# Patient Record
Sex: MALE
[De-identification: names, ages, dates, MRNs, and addresses within clinical notes are randomized per-mention and may not be internally consistent; named-entity substitution may affect disease eponyms.]

---

## 2019-05-24 ENCOUNTER — HOSPITAL (OUTPATIENT)
Dept: OTHER | Age: 24
End: 2019-05-24
Attending: SURGERY

## 2020-01-24 ENCOUNTER — TELEPHONE (OUTPATIENT)
Dept: SCHEDULING | Age: 25
End: 2020-01-24

## 2020-01-31 ENCOUNTER — APPOINTMENT (OUTPATIENT)
Dept: INTERNAL MEDICINE | Age: 25
End: 2020-01-31

## 2020-08-17 ENCOUNTER — OFFICE VISIT (OUTPATIENT)
Dept: URGENT CARE | Facility: CLINIC | Age: 25
End: 2020-08-17
Payer: COMMERCIAL

## 2020-08-17 VITALS
OXYGEN SATURATION: 97 % | TEMPERATURE: 98 F | RESPIRATION RATE: 19 BRPM | DIASTOLIC BLOOD PRESSURE: 88 MMHG | HEART RATE: 105 BPM | BODY MASS INDEX: 27.4 KG/M2 | HEIGHT: 69 IN | SYSTOLIC BLOOD PRESSURE: 136 MMHG | WEIGHT: 185 LBS

## 2020-08-17 DIAGNOSIS — Z03.818 ENCOUNTER FOR OBSERVATION FOR SUSPECTED EXPOSURE TO OTHER BIOLOGICAL AGENTS RULED OUT: ICD-10-CM

## 2020-08-17 DIAGNOSIS — J03.90 TONSILLITIS: Primary | ICD-10-CM

## 2020-08-17 LAB
CTP QC/QA: YES
S PYO RRNA THROAT QL PROBE: NEGATIVE

## 2020-08-17 PROCEDURE — U0003 INFECTIOUS AGENT DETECTION BY NUCLEIC ACID (DNA OR RNA); SEVERE ACUTE RESPIRATORY SYNDROME CORONAVIRUS 2 (SARS-COV-2) (CORONAVIRUS DISEASE [COVID-19]), AMPLIFIED PROBE TECHNIQUE, MAKING USE OF HIGH THROUGHPUT TECHNOLOGIES AS DESCRIBED BY CMS-2020-01-R: HCPCS

## 2020-08-17 PROCEDURE — 99203 PR OFFICE/OUTPT VISIT, NEW, LEVL III, 30-44 MIN: ICD-10-PCS | Mod: 25,S$GLB,, | Performed by: NURSE PRACTITIONER

## 2020-08-17 PROCEDURE — 87880 POCT RAPID STREP A: ICD-10-PCS | Mod: QW,S$GLB,, | Performed by: NURSE PRACTITIONER

## 2020-08-17 PROCEDURE — 99203 OFFICE O/P NEW LOW 30 MIN: CPT | Mod: 25,S$GLB,, | Performed by: NURSE PRACTITIONER

## 2020-08-17 PROCEDURE — 87880 STREP A ASSAY W/OPTIC: CPT | Mod: QW,S$GLB,, | Performed by: NURSE PRACTITIONER

## 2020-08-17 RX ORDER — IBUPROFEN 800 MG/1
800 TABLET ORAL 3 TIMES DAILY
Qty: 30 TABLET | Refills: 0 | Status: SHIPPED | OUTPATIENT
Start: 2020-08-17 | End: 2020-08-27

## 2020-08-17 NOTE — PATIENT INSTRUCTIONS
Here for TDAP injection. Patient without complaint of pain at this time ,Injection given. Tolerated well. No pain noted after injection.  Advised to wait in lobby 15 minutes and report any adverse reactions.         Site: LD    Baby Friendly Handout Given to Patient   TAKE IBUPROFEN AS NEEDED FOR PAIN  WE WILL CALL WITH RESULTS IN THE NEXT THREE DAYS  I WILL CALL YOU IN TWO DAYS TO SEE HOW YOU ARE DOING  You must understand that you've received an Urgent Care treatment only and that you may be released before all your medical problems are known or treated. You, the patient, will arrange for follow up care as instructed.  If your condition worsens we recommend that you receive another evaluation at the emergency room immediately or contact your primary medical clinics after hours call service to discuss your concerns.  Please return here or go to the Emergency Department for any concerns or worsening of condition.        Tonsillitis   Tonsillitis is an inflammation or infection of your child's tonsils. Your child has two tonsils, one on either side of his or her throat. The tonsils are large, oval glands. They help prevent infections. But tonsils can become infected themselves. Tonsillitis is a common childhood condition.    Tonsillitis can be caused by bacteria or a virus. The main symptom is a sore throat. Your child may also have a fever, throat redness or swelling, or trouble swallowing. The tonsils may also look white, gray, or yellow.  If your child has a bacterial infection, antibiotics may be prescribed. Antibiotics dont work against viral infections. In some cases of a viral infection, an antiviral medication may be prescribed. Once the problem has been treated, your child may need surgery to remove the tonsils if they become infected often or cause breathing problems.  Home care  If your childs health care provider has prescribed antibiotics or another medication, give it to your child as directed. Be sure your child finishes all of the medication, even if he or she feels better.  To help ease your childs sore throat:  · Give acetaminophen or ibuprofen. Follow the package instructions for giving these to a child. (Do not give aspirin to anyone younger than 18 years  old who is ill with a fever. It may cause severe liver damage.)  · Offer cool liquids to drink.  · Have your child gargle with warm salt water. An over-the-counter throat-numbing spray may also help.  The germs that cause tonsillitis are very contagious. To help prevent their spread, follow these tips:  · Teach your child to wash his or her hands frequently.  · Dont let your child share cups or utensils with other people.  · Keep your child away from other children until he or she is better.  Follow-up care  Follow up with your child's health care provider, or as advised.  When to seek medical advice  Unless advised otherwise, call your child's healthcare provider if:  · Your child is 3 months old or younger and has a fever of 100.4°F (38°C) or higher. Your child may need to see a healthcare provider.  · Your child is of any age and has fevers higher than 104°F (40°C) that come back again and again.  Also call if any of the following occur:  · Child has a sore throat for more than 2 days  · Child has a sore throat with fever, headache, stomachache, or rash  · Child has neck pain  · Child has a seizure  · Child is acting strangely  · Child has trouble swallowing or breathing  · Child cant open his or her mouth fully  Date Last Reviewed: 3/22/2015  © 2628-5777 [employer]. [address] [place] [address]. All rights reserved. This information is not intended as a substitute for professional medical care. Always follow your healthcare professional's instructions.

## 2020-08-17 NOTE — PROGRESS NOTES
"Subjective:       Patient ID: Meet Gilliam is a 24 y.o. male.    Vitals:  height is 5' 9" (1.753 m) and weight is 83.9 kg (185 lb). His oral temperature is 98.2 °F (36.8 °C). His blood pressure is 136/88 and his pulse is 105. His respiration is 19 and oxygen saturation is 97%.     Chief Complaint: COVID-19 Concerns    Pt states that he gets strep frequently and is concerned about that. Symptoms started this morning     Sore Throat   This is a new problem. The current episode started today. The problem has been unchanged. The pain is worse on the right side. There has been no fever. The pain is at a severity of 3/10. The pain is mild. Associated symptoms include trouble swallowing. Pertinent negatives include no congestion, coughing, diarrhea, headaches, shortness of breath or vomiting. He has had no exposure to strep or mono. He has tried nothing for the symptoms.       Constitution: Negative for chills, fatigue and fever.   HENT: Positive for sore throat and trouble swallowing. Negative for congestion and sinus pain.    Neck: Negative for painful lymph nodes.   Cardiovascular: Negative for chest pain and leg swelling.   Eyes: Negative for eye trauma, foreign body in eye, eye discharge, eye itching, eye pain, eye redness, photophobia, vision loss, double vision, blurred vision and eyelid swelling.   Respiratory: Negative for cough and shortness of breath.    Gastrointestinal: Negative for nausea, vomiting and diarrhea.   Genitourinary: Negative for dysuria, frequency and urgency.   Musculoskeletal: Negative for joint pain, joint swelling, muscle cramps and muscle ache.   Skin: Negative for color change, pale and rash.   Allergic/Immunologic: Negative for seasonal allergies and itching.   Neurological: Negative for dizziness, history of vertigo, light-headedness, passing out and headaches.   Hematologic/Lymphatic: Negative for swollen lymph nodes, easy bruising/bleeding and history of blood clots. Does not " bruise/bleed easily.   Psychiatric/Behavioral: Negative for nervous/anxious, sleep disturbance and depression. The patient is not nervous/anxious.        Objective:      Physical Exam   Constitutional: He is oriented to person, place, and time. He appears well-developed. He is cooperative.  Non-toxic appearance. He does not appear ill. No distress.   HENT:   Head: Normocephalic and atraumatic.   Ears:   Right Ear: Hearing, tympanic membrane, external ear and ear canal normal.   Left Ear: Hearing, tympanic membrane, external ear and ear canal normal.   Nose: Nose normal. No mucosal edema, rhinorrhea or nasal deformity. No epistaxis. Right sinus exhibits no maxillary sinus tenderness and no frontal sinus tenderness. Left sinus exhibits no maxillary sinus tenderness and no frontal sinus tenderness.   Mouth/Throat: Uvula is midline and mucous membranes are normal. No trismus in the jaw. Normal dentition. No uvula swelling. Posterior oropharyngeal erythema present. No oropharyngeal exudate or posterior oropharyngeal edema. Tonsils are 2+ on the right. Tonsils are 2+ on the left. No tonsillar exudate.   Eyes: Conjunctivae and lids are normal. No scleral icterus.   Neck: Trachea normal, full passive range of motion without pain and phonation normal. Neck supple. No neck rigidity. No edema and no erythema present.   Cardiovascular: Normal rate, regular rhythm, normal heart sounds and normal pulses.   Pulmonary/Chest: Effort normal and breath sounds normal. No respiratory distress. He has no decreased breath sounds. He has no rhonchi.   Abdominal: Normal appearance.   Musculoskeletal: Normal range of motion.         General: No deformity.   Neurological: He is alert and oriented to person, place, and time. He exhibits normal muscle tone. Coordination normal.   Skin: Skin is warm, dry, intact, not diaphoretic and not pale. Psychiatric: His speech is normal and behavior is normal. Judgment and thought content normal.   Nursing  note and vitals reviewed.        Assessment:       1. Tonsillitis        Plan:         Tonsillitis  -     POCT rapid strep A  -     (Magic mouthwash) 1:1:1 Benadryl 12.5mg/5ml liq, aluminum & magnesium hydroxide-simehticone (Maalox), lidocaine viscous 2%; Swish and spit 10 mLs every 4 (four) hours as needed. for mouth sores  Dispense: 200 mL; Refill: 0  -     ibuprofen (ADVIL,MOTRIN) 800 MG tablet; Take 1 tablet (800 mg total) by mouth 3 (three) times daily. for 10 days  Dispense: 30 tablet; Refill: 0        -COVID screening       Patient Instructions   TAKE IBUPROFEN AS NEEDED FOR PAIN  WE WILL CALL WITH RESULTS IN THE NEXT THREE DAYS  I WILL CALL YOU IN TWO DAYS TO SEE HOW YOU ARE DOING  You must understand that you've received an Urgent Care treatment only and that you may be released before all your medical problems are known or treated. You, the patient, will arrange for follow up care as instructed.  If your condition worsens we recommend that you receive another evaluation at the emergency room immediately or contact your primary medical clinics after hours call service to discuss your concerns.  Please return here or go to the Emergency Department for any concerns or worsening of condition.        Tonsillitis   Tonsillitis is an inflammation or infection of your child's tonsils. Your child has two tonsils, one on either side of his or her throat. The tonsils are large, oval glands. They help prevent infections. But tonsils can become infected themselves. Tonsillitis is a common childhood condition.    Tonsillitis can be caused by bacteria or a virus. The main symptom is a sore throat. Your child may also have a fever, throat redness or swelling, or trouble swallowing. The tonsils may also look white, gray, or yellow.  If your child has a bacterial infection, antibiotics may be prescribed. Antibiotics dont work against viral infections. In some cases of a viral infection, an antiviral medication may be  prescribed. Once the problem has been treated, your child may need surgery to remove the tonsils if they become infected often or cause breathing problems.  Home care  If your childs health care provider has prescribed antibiotics or another medication, give it to your child as directed. Be sure your child finishes all of the medication, even if he or she feels better.  To help ease your childs sore throat:  · Give acetaminophen or ibuprofen. Follow the package instructions for giving these to a child. (Do not give aspirin to anyone younger than 18 years old who is ill with a fever. It may cause severe liver damage.)  · Offer cool liquids to drink.  · Have your child gargle with warm salt water. An over-the-counter throat-numbing spray may also help.  The germs that cause tonsillitis are very contagious. To help prevent their spread, follow these tips:  · Teach your child to wash his or her hands frequently.  · Dont let your child share cups or utensils with other people.  · Keep your child away from other children until he or she is better.  Follow-up care  Follow up with your child's health care provider, or as advised.  When to seek medical advice  Unless advised otherwise, call your child's healthcare provider if:  · Your child is 3 months old or younger and has a fever of 100.4°F (38°C) or higher. Your child may need to see a healthcare provider.  · Your child is of any age and has fevers higher than 104°F (40°C) that come back again and again.  Also call if any of the following occur:  · Child has a sore throat for more than 2 days  · Child has a sore throat with fever, headache, stomachache, or rash  · Child has neck pain  · Child has a seizure  · Child is acting strangely  · Child has trouble swallowing or breathing  · Child cant open his or her mouth fully  Date Last Reviewed: 3/22/2015  © 1664-3196 [employer]. [address] [place] [address], [place] [address]. All rights reserved. This  information is not intended as a substitute for professional medical care. Always follow your healthcare professional's instructions.

## 2020-08-19 LAB — SARS-COV-2 RNA RESP QL NAA+PROBE: NOT DETECTED

## 2020-08-20 ENCOUNTER — TELEPHONE (OUTPATIENT)
Dept: URGENT CARE | Facility: CLINIC | Age: 25
End: 2020-08-20

## 2020-08-20 NOTE — TELEPHONE ENCOUNTER
Patient was given negative COVID-19 was on 08/17/2022.  Patient reports significant improvement in his symptoms.  Clinic versus ER precautions given.  Patient verbalized understanding and agreed with care.

## 2020-10-07 ENCOUNTER — CLINICAL SUPPORT (OUTPATIENT)
Dept: URGENT CARE | Facility: CLINIC | Age: 25
End: 2020-10-07
Payer: COMMERCIAL

## 2020-10-07 ENCOUNTER — OFFICE VISIT (OUTPATIENT)
Dept: URGENT CARE | Facility: CLINIC | Age: 25
End: 2020-10-07
Payer: COMMERCIAL

## 2020-10-07 DIAGNOSIS — Z23 IMMUNIZATION DUE: Primary | ICD-10-CM

## 2020-10-07 PROCEDURE — 90686 FLU VACCINE (QUAD) GREATER THAN OR EQUAL TO 3YO PRESERVATIVE FREE IM: ICD-10-PCS | Mod: S$GLB,,, | Performed by: PHYSICIAN ASSISTANT

## 2020-10-07 PROCEDURE — 90686 IIV4 VACC NO PRSV 0.5 ML IM: CPT | Mod: S$GLB,,, | Performed by: PHYSICIAN ASSISTANT

## 2020-10-07 PROCEDURE — 90471 FLU VACCINE (QUAD) GREATER THAN OR EQUAL TO 3YO PRESERVATIVE FREE IM: ICD-10-PCS | Mod: S$GLB,,, | Performed by: PHYSICIAN ASSISTANT

## 2020-10-07 PROCEDURE — 90471 IMMUNIZATION ADMIN: CPT | Mod: S$GLB,,, | Performed by: PHYSICIAN ASSISTANT

## 2020-10-14 ENCOUNTER — OFFICE VISIT (OUTPATIENT)
Dept: URGENT CARE | Facility: CLINIC | Age: 25
End: 2020-10-14
Payer: COMMERCIAL

## 2020-10-14 VITALS
RESPIRATION RATE: 18 BRPM | HEART RATE: 98 BPM | WEIGHT: 180 LBS | TEMPERATURE: 98 F | BODY MASS INDEX: 26.66 KG/M2 | HEIGHT: 69 IN | SYSTOLIC BLOOD PRESSURE: 142 MMHG | OXYGEN SATURATION: 98 % | DIASTOLIC BLOOD PRESSURE: 95 MMHG

## 2020-10-14 DIAGNOSIS — Z11.59 SCREENING FOR VIRAL DISEASE: Primary | ICD-10-CM

## 2020-10-14 DIAGNOSIS — J02.9 SORE THROAT: ICD-10-CM

## 2020-10-14 LAB
CTP QC/QA: YES
CTP QC/QA: YES
MOLECULAR STREP A: NEGATIVE
SARS-COV-2 RDRP RESP QL NAA+PROBE: NEGATIVE

## 2020-10-14 PROCEDURE — 87651 STREP A DNA AMP PROBE: CPT | Mod: QW,S$GLB,, | Performed by: NURSE PRACTITIONER

## 2020-10-14 PROCEDURE — 99214 PR OFFICE/OUTPT VISIT, EST, LEVL IV, 30-39 MIN: ICD-10-PCS | Mod: S$GLB,CS,, | Performed by: NURSE PRACTITIONER

## 2020-10-14 PROCEDURE — 99214 OFFICE O/P EST MOD 30 MIN: CPT | Mod: S$GLB,CS,, | Performed by: NURSE PRACTITIONER

## 2020-10-14 PROCEDURE — U0002 COVID-19 LAB TEST NON-CDC: HCPCS | Mod: QW,S$GLB,, | Performed by: NURSE PRACTITIONER

## 2020-10-14 PROCEDURE — 87651 POCT STREP A MOLECULAR: ICD-10-PCS | Mod: QW,S$GLB,, | Performed by: NURSE PRACTITIONER

## 2020-10-14 PROCEDURE — U0002: ICD-10-PCS | Mod: QW,S$GLB,, | Performed by: NURSE PRACTITIONER

## 2020-10-14 NOTE — PROGRESS NOTES
"Subjective:       Patient ID: Meet Gilliam is a 24 y.o. male.    Vitals:  height is 5' 9" (1.753 m) and weight is 81.6 kg (180 lb). His temperature is 97.5 °F (36.4 °C). His blood pressure is 142/95 (abnormal) and his pulse is 98. His respiration is 18 and oxygen saturation is 98%.     Chief Complaint: Sore Throat    Sore throat for the past 2days    Sore Throat   This is a new problem. The current episode started yesterday. Associated symptoms include trouble swallowing. Pertinent negatives include no congestion, coughing, diarrhea, headaches, shortness of breath or vomiting.       Constitution: Negative for chills, fatigue and fever.   HENT: Positive for sore throat and trouble swallowing. Negative for congestion.    Neck: Negative for painful lymph nodes.   Cardiovascular: Negative for chest pain and leg swelling.   Eyes: Negative.  Negative for double vision and blurred vision.   Respiratory: Negative.  Negative for cough and shortness of breath.    Gastrointestinal: Negative.  Negative for nausea, vomiting and diarrhea.   Genitourinary: Negative for dysuria, frequency and urgency.   Musculoskeletal: Negative for joint pain, joint swelling, muscle cramps and muscle ache.   Skin: Negative for color change, pale and rash.   Allergic/Immunologic: Negative for seasonal allergies.   Neurological: Negative.  Negative for dizziness, history of vertigo, light-headedness, passing out and headaches.   Hematologic/Lymphatic: Negative for swollen lymph nodes, easy bruising/bleeding and history of blood clots. Does not bruise/bleed easily.   Psychiatric/Behavioral: Negative for nervous/anxious, sleep disturbance and depression. The patient is not nervous/anxious.        Objective:      Physical Exam   Constitutional: He is oriented to person, place, and time. He appears well-developed. He is cooperative.  Non-toxic appearance. He does not appear ill. No distress.   HENT:   Head: Normocephalic and atraumatic.   Ears:   Right " Ear: Hearing, tympanic membrane, external ear and ear canal normal.   Left Ear: Hearing, tympanic membrane, external ear and ear canal normal.   Nose: Nose normal. No mucosal edema, rhinorrhea or nasal deformity. No epistaxis. Right sinus exhibits no maxillary sinus tenderness and no frontal sinus tenderness. Left sinus exhibits no maxillary sinus tenderness and no frontal sinus tenderness.   Mouth/Throat: Uvula is midline, oropharynx is clear and moist and mucous membranes are normal. No trismus in the jaw. Normal dentition. No uvula swelling. No oropharyngeal exudate, posterior oropharyngeal edema or posterior oropharyngeal erythema.   Eyes: Conjunctivae and lids are normal. No scleral icterus.   Neck: Trachea normal, full passive range of motion without pain and phonation normal. Neck supple. No neck rigidity. No edema and no erythema present.   Cardiovascular: Normal rate, regular rhythm, normal heart sounds and normal pulses.   Pulmonary/Chest: Effort normal and breath sounds normal. No respiratory distress. He has no decreased breath sounds. He has no rhonchi.   Abdominal: Normal appearance.   Musculoskeletal: Normal range of motion.         General: No deformity.   Neurological: He is alert and oriented to person, place, and time. He exhibits normal muscle tone. Coordination normal.   Skin: Skin is warm, dry, intact, not diaphoretic and not pale. Psychiatric: His speech is normal and behavior is normal. Judgment and thought content normal.   Nursing note and vitals reviewed.        Results for orders placed or performed in visit on 10/14/20   POCT COVID-19 Rapid Screening   Result Value Ref Range    POC Rapid COVID Negative Negative     Acceptable Yes      Assessment:       1. Screening for viral disease    2. Sore throat        Plan:         Screening for viral disease    Sore throat  -     POCT COVID-19 Rapid Screening  -     POCT Strep A, Molecular      Patient Instructions     Take  ibuprofen every 6 hours as needed for pain.     Increase fluid intake.     Follow up if symptoms persist or worsen.     You must understand that you've received an Urgent Care treatment only and that you may be released before all your medical problems are known or treated. You, the patient, will arrange for follow up care as instructed.  Follow up with your PCP or specialty clinic as directed in the next 1-2 weeks if not improved or as needed.  You can call [phone] to schedule an appointment with the appropriate provider.  If your condition worsens we recommend that you receive another evaluation at the emergency room immediately or contact your primary medical clinics after hours call service to discuss your concerns.  Please return here or go to the Emergency Department for any concerns or worsening of condition.      When You Have a Sore Throat    A sore throat can be painful. There are many reasons why you may have a sore throat. Your healthcare provider will work with you to find the cause of your sore throat. He or she will also find the best treatment for you.  What causes a sore throat?  Sore throats can be caused or worsened by:  · Cold or flu viruses  · Bacteria  · Irritants such as tobacco smoke or air pollution  · Acid reflux  A healthy throat  The tonsils are on the sides of the throat near the base of the tongue. They collect viruses and bacteria and help fight infection. The throat (pharynx) is the passage for air. Mucus from the nasal cavity also moves down the passage.  An inflamed throat  The tonsils and pharynx can become inflamed due to a cold or flu virus. Postnasal drip (excess mucus draining from the nasal cavity) can irritate the throat. It can also make the throat or tonsils more likely to be infected by bacteria. Severe, untreated tonsillitis in children or adults can cause a pocket of pus (abscess) to form near the tonsil.  Your evaluation  A medical evaluation can help find the  cause of your sore throat. It can also help your healthcare provider choose the best treatment for you. The evaluation may include a health history, physical exam, and diagnostic tests.  Health history  Your healthcare provider may ask you the following:  · How long has the sore throat lasted and how have you been treating it?  · Do you have any other symptoms, such as body aches, fever, or cough?  · Does your sore throat recur? If so, how often? How many days of school or work have you missed because of a sore throat?  · Do you have trouble eating or swallowing?  · Have you been told that you snore or have other sleep problems?  · Do you have bad breath?  · Do you cough up bad-tasting mucus?  Physical exam  During the exam, your healthcare provider checks your ears, nose, and throat for problems. He or she also checks for swelling in the neck, and may listen to your chest.  Possible tests  Other tests your healthcare provider may perform include:  · A throat swab to check for bacteria such as streptococcus (the bacteria that causes strep throat)  · A blood test to check for mononucleosis (a viral infection)  · A chest X-ray to rule out pneumonia, especially if you have a cough  Treating a sore throat  Treatment depends on many factors. What is the likely cause? Is the problem recent? Does it keep coming back? In many cases, the best thing to do is to treat the symptoms, rest, and let the problem heal itself. Antibiotics may help clear up some bacterial infections. For cases of severe or recurring tonsillitis, the tonsils may need to be removed.  Relieving your symptoms  · Dont smoke, and avoid secondhand smoke.  · For children, try throat sprays or Popsicles. Adults and older children may try lozenges.  · Drink warm liquids to soothe the throat and help thin mucus. Avoid alcohol, spicy foods, and acidic drinks such as orange juice. These can irritate the throat.  · Gargle with warm saltwater (1 teaspoon of salt  "to 8 ounces of warm water).  · Use a humidifier to keep air moist and relieve throat dryness.  · Try over-the-counter pain relievers such as acetaminophen or ibuprofen. Use as directed, and dont exceed the recommended dose. Dont give aspirin to children.   Are antibiotics needed?  If your sore throat is due to a bacterial infection, antibiotics may speed healing and prevent complications. Although group A streptococcus ("strep throat" or GAS) is the major treatable infection for a sore throat, GAS causes only 5% to 15% of sore throats in adults who seek medical care. Most sore throats are caused by cold or flu viruses. And antibiotics dont treat viral illness. In fact, using antibiotics when theyre not needed may produce bacteria that are harder to kill. Your healthcare provider will prescribe antibiotics only if he or she thinks they are likely to help.  If antibiotics are prescribed  Take the medicine exactly as directed. Be sure to finish your prescription even if youre feeling better. And be sure to ask your healthcare provider or pharmacist what side effects are common and what to do about them.  Is surgery needed?  In some cases, tonsils need to be removed. This is often done as outpatient (same-day) surgery. Your healthcare provider may advise removing the tonsils in cases of:  · Several severe bouts of tonsillitis in a year. Severe episodes include those that lead to missed days of school or work, or that need to be treated with antibiotics.  · Tonsillitis that causes breathing problems during sleep  · Tonsillitis caused by food particles collecting in pouches in the tonsils (cryptic tonsillitis)  Call your healthcare provider if any of the following occur:  · Symptoms worsen, or new symptoms develop.  · Swollen tonsils make breathing difficult.  · The pain is severe enough to keep you from drinking liquids.  · A skin rash, hives, or wheezing develops. Any of these could signal an allergic reaction to " antibiotics.  · Symptoms dont improve within a week.  · Symptoms dont improve within 2 to 3 days of starting antibiotics.   Date Last Reviewed: 10/1/2016  © 3404-3820 [employer]. [address] [place] [address], [place] [address]. All rights reserved. This information is not intended as a substitute for professional medical care. Always follow your healthcare professional's instructions.

## 2020-10-14 NOTE — PATIENT INSTRUCTIONS
Take ibuprofen every 6 hours as needed for pain.     Increase fluid intake.     Follow up if symptoms persist or worsen.     You must understand that you've received an Urgent Care treatment only and that you may be released before all your medical problems are known or treated. You, the patient, will arrange for follow up care as instructed.  Follow up with your PCP or specialty clinic as directed in the next 1-2 weeks if not improved or as needed.  You can call [phone] to schedule an appointment with the appropriate provider.  If your condition worsens we recommend that you receive another evaluation at the emergency room immediately or contact your primary medical clinics after hours call service to discuss your concerns.  Please return here or go to the Emergency Department for any concerns or worsening of condition.      When You Have a Sore Throat    A sore throat can be painful. There are many reasons why you may have a sore throat. Your healthcare provider will work with you to find the cause of your sore throat. He or she will also find the best treatment for you.  What causes a sore throat?  Sore throats can be caused or worsened by:  · Cold or flu viruses  · Bacteria  · Irritants such as tobacco smoke or air pollution  · Acid reflux  A healthy throat  The tonsils are on the sides of the throat near the base of the tongue. They collect viruses and bacteria and help fight infection. The throat (pharynx) is the passage for air. Mucus from the nasal cavity also moves down the passage.  An inflamed throat  The tonsils and pharynx can become inflamed due to a cold or flu virus. Postnasal drip (excess mucus draining from the nasal cavity) can irritate the throat. It can also make the throat or tonsils more likely to be infected by bacteria. Severe, untreated tonsillitis in children or adults can cause a pocket of pus (abscess) to form near the tonsil.  Your evaluation  A medical evaluation can help find  the cause of your sore throat. It can also help your healthcare provider choose the best treatment for you. The evaluation may include a health history, physical exam, and diagnostic tests.  Health history  Your healthcare provider may ask you the following:  · How long has the sore throat lasted and how have you been treating it?  · Do you have any other symptoms, such as body aches, fever, or cough?  · Does your sore throat recur? If so, how often? How many days of school or work have you missed because of a sore throat?  · Do you have trouble eating or swallowing?  · Have you been told that you snore or have other sleep problems?  · Do you have bad breath?  · Do you cough up bad-tasting mucus?  Physical exam  During the exam, your healthcare provider checks your ears, nose, and throat for problems. He or she also checks for swelling in the neck, and may listen to your chest.  Possible tests  Other tests your healthcare provider may perform include:  · A throat swab to check for bacteria such as streptococcus (the bacteria that causes strep throat)  · A blood test to check for mononucleosis (a viral infection)  · A chest X-ray to rule out pneumonia, especially if you have a cough  Treating a sore throat  Treatment depends on many factors. What is the likely cause? Is the problem recent? Does it keep coming back? In many cases, the best thing to do is to treat the symptoms, rest, and let the problem heal itself. Antibiotics may help clear up some bacterial infections. For cases of severe or recurring tonsillitis, the tonsils may need to be removed.  Relieving your symptoms  · Dont smoke, and avoid secondhand smoke.  · For children, try throat sprays or Popsicles. Adults and older children may try lozenges.  · Drink warm liquids to soothe the throat and help thin mucus. Avoid alcohol, spicy foods, and acidic drinks such as orange juice. These can irritate the throat.  · Gargle with warm saltwater (1 teaspoon of  "salt to 8 ounces of warm water).  · Use a humidifier to keep air moist and relieve throat dryness.  · Try over-the-counter pain relievers such as acetaminophen or ibuprofen. Use as directed, and dont exceed the recommended dose. Dont give aspirin to children.   Are antibiotics needed?  If your sore throat is due to a bacterial infection, antibiotics may speed healing and prevent complications. Although group A streptococcus ("strep throat" or GAS) is the major treatable infection for a sore throat, GAS causes only 5% to 15% of sore throats in adults who seek medical care. Most sore throats are caused by cold or flu viruses. And antibiotics dont treat viral illness. In fact, using antibiotics when theyre not needed may produce bacteria that are harder to kill. Your healthcare provider will prescribe antibiotics only if he or she thinks they are likely to help.  If antibiotics are prescribed  Take the medicine exactly as directed. Be sure to finish your prescription even if youre feeling better. And be sure to ask your healthcare provider or pharmacist what side effects are common and what to do about them.  Is surgery needed?  In some cases, tonsils need to be removed. This is often done as outpatient (same-day) surgery. Your healthcare provider may advise removing the tonsils in cases of:  · Several severe bouts of tonsillitis in a year. Severe episodes include those that lead to missed days of school or work, or that need to be treated with antibiotics.  · Tonsillitis that causes breathing problems during sleep  · Tonsillitis caused by food particles collecting in pouches in the tonsils (cryptic tonsillitis)  Call your healthcare provider if any of the following occur:  · Symptoms worsen, or new symptoms develop.  · Swollen tonsils make breathing difficult.  · The pain is severe enough to keep you from drinking liquids.  · A skin rash, hives, or wheezing develops. Any of these could signal an allergic " reaction to antibiotics.  · Symptoms dont improve within a week.  · Symptoms dont improve within 2 to 3 days of starting antibiotics.   Date Last Reviewed: 10/1/2016  © 8867-9313 [employer]. [address] [place] [address], [place] [address]. All rights reserved. This information is not intended as a substitute for professional medical care. Always follow your healthcare professional's instructions.

## 2020-11-08 ENCOUNTER — OFFICE VISIT (OUTPATIENT)
Dept: URGENT CARE | Facility: CLINIC | Age: 25
End: 2020-11-08
Payer: COMMERCIAL

## 2020-11-08 VITALS
DIASTOLIC BLOOD PRESSURE: 100 MMHG | OXYGEN SATURATION: 98 % | SYSTOLIC BLOOD PRESSURE: 143 MMHG | BODY MASS INDEX: 26.66 KG/M2 | RESPIRATION RATE: 16 BRPM | TEMPERATURE: 97 F | HEIGHT: 69 IN | HEART RATE: 96 BPM | WEIGHT: 180 LBS

## 2020-11-08 DIAGNOSIS — L03.113 CELLULITIS OF RIGHT UPPER EXTREMITY: Primary | ICD-10-CM

## 2020-11-08 DIAGNOSIS — T63.484A INSECT STINGS, UNDETERMINED INTENT, INITIAL ENCOUNTER: ICD-10-CM

## 2020-11-08 PROCEDURE — 99214 PR OFFICE/OUTPT VISIT, EST, LEVL IV, 30-39 MIN: ICD-10-PCS | Mod: S$GLB,,, | Performed by: NURSE PRACTITIONER

## 2020-11-08 PROCEDURE — 99214 OFFICE O/P EST MOD 30 MIN: CPT | Mod: S$GLB,,, | Performed by: NURSE PRACTITIONER

## 2020-11-08 RX ORDER — SULFAMETHOXAZOLE AND TRIMETHOPRIM 800; 160 MG/1; MG/1
1 TABLET ORAL 2 TIMES DAILY
Qty: 14 TABLET | Refills: 0 | Status: SHIPPED | OUTPATIENT
Start: 2020-11-08 | End: 2020-11-15

## 2020-11-08 RX ORDER — MUPIROCIN 20 MG/G
OINTMENT TOPICAL
Qty: 22 G | Refills: 1 | Status: SHIPPED | OUTPATIENT
Start: 2020-11-08 | End: 2022-04-25

## 2020-11-08 NOTE — PROGRESS NOTES
"Subjective:       Patient ID: Meet Gilliam is a 25 y.o. male.    Vitals:  height is 5' 9" (1.753 m) and weight is 81.6 kg (180 lb). His temperature is 97.2 °F (36.2 °C). His blood pressure is 143/100 (abnormal) and his pulse is 96. His respiration is 16 and oxygen saturation is 98%.     Chief Complaint: Insect Bite    Pt has insect bite on RT Arm has worsened in redness since Friday.     Insect Bite  This is a new problem. The current episode started in the past 7 days (2 days). The problem occurs constantly. The problem has been gradually worsening. Pertinent negatives include no arthralgias, chest pain, chills, congestion, coughing, fatigue, fever, headaches, joint swelling, myalgias, nausea, rash, sore throat, vertigo or vomiting. Nothing aggravates the symptoms. He has tried nothing for the symptoms.       Constitution: Negative for chills, fatigue and fever.   HENT: Negative for congestion and sore throat.    Neck: Negative for painful lymph nodes.   Cardiovascular: Negative for chest pain and leg swelling.   Eyes: Negative for double vision and blurred vision.   Respiratory: Negative for cough and shortness of breath.    Gastrointestinal: Negative for nausea, vomiting and diarrhea.   Genitourinary: Negative for dysuria, frequency and urgency.   Musculoskeletal: Negative for joint pain, joint swelling, muscle cramps and muscle ache.   Skin: Negative for color change, pale and rash.   Allergic/Immunologic: Negative for seasonal allergies.   Neurological: Negative for dizziness, history of vertigo, light-headedness, passing out and headaches.   Hematologic/Lymphatic: Negative for swollen lymph nodes, easy bruising/bleeding and history of blood clots. Does not bruise/bleed easily.   Psychiatric/Behavioral: Negative for nervous/anxious, sleep disturbance and depression. The patient is not nervous/anxious.        Objective:      Physical Exam   Constitutional: He is oriented to person, place, and time. He appears " well-developed. He is cooperative.  Non-toxic appearance. He does not appear ill. No distress.   HENT:   Head: Normocephalic and atraumatic.   Ears:   Right Ear: Hearing, tympanic membrane, external ear and ear canal normal.   Left Ear: Hearing, tympanic membrane, external ear and ear canal normal.   Nose: Nose normal. No mucosal edema, rhinorrhea or nasal deformity. No epistaxis. Right sinus exhibits no maxillary sinus tenderness and no frontal sinus tenderness. Left sinus exhibits no maxillary sinus tenderness and no frontal sinus tenderness.   Mouth/Throat: Uvula is midline, oropharynx is clear and moist and mucous membranes are normal. No trismus in the jaw. Normal dentition. No uvula swelling. No posterior oropharyngeal erythema.   Eyes: Conjunctivae and lids are normal. Right eye exhibits no discharge. Left eye exhibits no discharge. No scleral icterus.   Neck: Trachea normal, normal range of motion, full passive range of motion without pain and phonation normal. Neck supple.   Cardiovascular: Normal rate, regular rhythm, normal heart sounds and normal pulses.   Pulmonary/Chest: Effort normal and breath sounds normal. No respiratory distress.   Abdominal: Soft. Normal appearance and bowel sounds are normal. He exhibits no distension, no pulsatile midline mass and no mass. There is no abdominal tenderness.   Musculoskeletal: Normal range of motion.         General: No deformity.   Neurological: He is alert and oriented to person, place, and time. He exhibits normal muscle tone. Coordination normal.   Skin: Skin is warm, dry, intact, not diaphoretic, not pale, rash and macular.      Psychiatric: His speech is normal and behavior is normal. Judgment and thought content normal.   Nursing note and vitals reviewed.              Assessment:       1. Cellulitis of right upper extremity        Plan:         Cellulitis of right upper extremity  -     sulfamethoxazole-trimethoprim 800-160mg (BACTRIM DS) 800-160 mg Tab;  Take 1 tablet by mouth 2 (two) times daily. for 7 days  Dispense: 14 tablet; Refill: 0  -     mupirocin (BACTROBAN) 2 % ointment; Apply to affected area 3 times daily  Dispense: 22 g; Refill: 1      Patient Instructions     Take oral antibiotics as prescribed.     Use cool compresses to help with redness, swelling , and itching.     Follow up if symptoms persist or worsen such as increased redness or fever.    You must understand that you've received an Urgent Care treatment only and that you may be released before all your medical problems are known or treated. You, the patient, will arrange for follow up care as instructed.  Follow up with your PCP or specialty clinic as directed in the next 1-2 weeks if not improved or as needed.  You can call [phone] to schedule an appointment with the appropriate provider.  If your condition worsens we recommend that you receive another evaluation at the emergency room immediately or contact your primary medical clinics after hours call service to discuss your concerns.  Please return here or go to the Emergency Department for any concerns or worsening of condition.      Discharge Instructions for Cellulitis  You have been diagnosed with cellulitis. This is an infection in the deepest layer of the skin. In some cases, the infection also affects the muscle. Cellulitis is caused by bacteria. The bacteria can enter the body through broken skin. This can happen with a cut, scratch, animal bite, or an insect bite that has been scratched. You may have been treated in the hospital with antibiotics and fluids. You will likely be given a prescription for antibiotics to take at home. This sheet will help you take care of yourself at home.  Home care  When you are home:  · Take the prescribed antibiotic medicine you are given as directed until it is gone. Take it even if you feel better. It treats the infection and stops it from returning. Not taking all the medicine can make  future infections hard to treat.  · Keep the infected area clean.  · When possible, raise the infected area above the level of your heart. This helps keep swelling down.  · Talk with your healthcare provider if you are in pain. Ask what kind of over-the-counter medicine you can take for pain.  · Apply clean bandages as advised.  · Take your temperature once a day for a week.  · Wash your hands often to prevent spreading the infection.  In the future, wash your hands before and after you touch cuts, scratches, or bandages. This will help prevent infection.   When to call your healthcare provider  Call your healthcare provider immediately if you have any of the following:  · Difficulty or pain when moving the joints above or below the infected area  · Discharge or pus draining from the area  · Fever of 100.4°F (38°C) or higher, or as directed by your healthcare provider  · Pain that gets worse in or around the infected   · Redness that gets worse in or around the infected area, particularly if the area of redness expands to a wider area  · Shaking chills  · Swelling of the infected area  · Vomiting   Date Last Reviewed: 8/1/2016  © 9836-6314 [employer]. [address] [place] [address]. All rights reserved. This information is not intended as a substitute for professional medical care. Always follow your healthcare professional's instructions.

## 2020-11-08 NOTE — PATIENT INSTRUCTIONS
Take oral antibiotics as prescribed.     Use cool compresses to help with redness, swelling , and itching.     Follow up if symptoms persist or worsen such as increased redness or fever.    You must understand that you've received an Urgent Care treatment only and that you may be released before all your medical problems are known or treated. You, the patient, will arrange for follow up care as instructed.  Follow up with your PCP or specialty clinic as directed in the next 1-2 weeks if not improved or as needed.  You can call [phone] to schedule an appointment with the appropriate provider.  If your condition worsens we recommend that you receive another evaluation at the emergency room immediately or contact your primary medical clinics after hours call service to discuss your concerns.  Please return here or go to the Emergency Department for any concerns or worsening of condition.      Discharge Instructions for Cellulitis  You have been diagnosed with cellulitis. This is an infection in the deepest layer of the skin. In some cases, the infection also affects the muscle. Cellulitis is caused by bacteria. The bacteria can enter the body through broken skin. This can happen with a cut, scratch, animal bite, or an insect bite that has been scratched. You may have been treated in the hospital with antibiotics and fluids. You will likely be given a prescription for antibiotics to take at home. This sheet will help you take care of yourself at home.  Home care  When you are home:  · Take the prescribed antibiotic medicine you are given as directed until it is gone. Take it even if you feel better. It treats the infection and stops it from returning. Not taking all the medicine can make future infections hard to treat.  · Keep the infected area clean.  · When possible, raise the infected area above the level of your heart. This helps keep swelling down.  · Talk with your healthcare provider if you are in pain.  Ask what kind of over-the-counter medicine you can take for pain.  · Apply clean bandages as advised.  · Take your temperature once a day for a week.  · Wash your hands often to prevent spreading the infection.  In the future, wash your hands before and after you touch cuts, scratches, or bandages. This will help prevent infection.   When to call your healthcare provider  Call your healthcare provider immediately if you have any of the following:  · Difficulty or pain when moving the joints above or below the infected area  · Discharge or pus draining from the area  · Fever of 100.4°F (38°C) or higher, or as directed by your healthcare provider  · Pain that gets worse in or around the infected   · Redness that gets worse in or around the infected area, particularly if the area of redness expands to a wider area  · Shaking chills  · Swelling of the infected area  · Vomiting   Date Last Reviewed: 8/1/2016  © 4923-6111 [employer]. [address] [place] [address], [place] [address]. All rights reserved. This information is not intended as a substitute for professional medical care. Always follow your healthcare professional's instructions.

## 2020-12-24 ENCOUNTER — OFFICE VISIT (OUTPATIENT)
Dept: URGENT CARE | Facility: CLINIC | Age: 25
End: 2020-12-24
Payer: COMMERCIAL

## 2020-12-24 VITALS
OXYGEN SATURATION: 98 % | WEIGHT: 185 LBS | HEIGHT: 69 IN | HEART RATE: 110 BPM | DIASTOLIC BLOOD PRESSURE: 84 MMHG | TEMPERATURE: 98 F | SYSTOLIC BLOOD PRESSURE: 134 MMHG | BODY MASS INDEX: 27.4 KG/M2

## 2020-12-24 DIAGNOSIS — J02.9 SORE THROAT: Primary | ICD-10-CM

## 2020-12-24 DIAGNOSIS — H66.91 RIGHT OTITIS MEDIA, UNSPECIFIED OTITIS MEDIA TYPE: ICD-10-CM

## 2020-12-24 DIAGNOSIS — H92.09 OTALGIA, UNSPECIFIED LATERALITY: ICD-10-CM

## 2020-12-24 DIAGNOSIS — Z11.59 SCREENING FOR VIRAL DISEASE: ICD-10-CM

## 2020-12-24 PROCEDURE — 87651 POCT STREP A MOLECULAR: ICD-10-PCS | Mod: QW,S$GLB,, | Performed by: FAMILY MEDICINE

## 2020-12-24 PROCEDURE — 3008F PR BODY MASS INDEX (BMI) DOCUMENTED: ICD-10-PCS | Mod: CPTII,S$GLB,, | Performed by: FAMILY MEDICINE

## 2020-12-24 PROCEDURE — U0002 COVID-19 LAB TEST NON-CDC: HCPCS | Mod: QW,S$GLB,, | Performed by: FAMILY MEDICINE

## 2020-12-24 PROCEDURE — 99214 PR OFFICE/OUTPT VISIT, EST, LEVL IV, 30-39 MIN: ICD-10-PCS | Mod: S$GLB,,, | Performed by: FAMILY MEDICINE

## 2020-12-24 PROCEDURE — U0002: ICD-10-PCS | Mod: QW,S$GLB,, | Performed by: FAMILY MEDICINE

## 2020-12-24 PROCEDURE — 99214 OFFICE O/P EST MOD 30 MIN: CPT | Mod: S$GLB,,, | Performed by: FAMILY MEDICINE

## 2020-12-24 PROCEDURE — 3008F BODY MASS INDEX DOCD: CPT | Mod: CPTII,S$GLB,, | Performed by: FAMILY MEDICINE

## 2020-12-24 PROCEDURE — 87651 STREP A DNA AMP PROBE: CPT | Mod: QW,S$GLB,, | Performed by: FAMILY MEDICINE

## 2020-12-24 RX ORDER — AMOXICILLIN 875 MG/1
875 TABLET, FILM COATED ORAL 2 TIMES DAILY
Qty: 20 TABLET | Refills: 0 | Status: SHIPPED | OUTPATIENT
Start: 2020-12-24 | End: 2021-01-03

## 2020-12-24 NOTE — PROGRESS NOTES
"Subjective:       Patient ID: Meet Gilliam is a 25 y.o. male.    Vitals:  height is 5' 9" (1.753 m) and weight is 83.9 kg (185 lb). His temperature is 98.1 °F (36.7 °C). His blood pressure is 134/84 and his pulse is 110. His oxygen saturation is 98%.     Chief Complaint: Sore Throat    Patient presents with complaint of sore throat and ear pain off and on for 1 week.  Denies fever, chills, cough, runny nose, chest pain, shortness of breath, headache, body aches, fatigue, nausea/vomiting, diarrhea, loss of smell or taste.    Sore Throat   This is a new problem. The current episode started 1 to 4 weeks ago. The problem has been unchanged. The pain is worse on the right side. There has been no fever. The pain is at a severity of 6/10. The pain is moderate. Associated symptoms include ear pain. Pertinent negatives include no congestion, coughing, shortness of breath, stridor or vomiting.       Constitution: Negative for chills, sweating, fatigue and fever.   HENT: Positive for ear pain and sore throat. Negative for congestion, sinus pain, sinus pressure and voice change.    Neck: Negative for painful lymph nodes.   Eyes: Negative for eye redness.   Respiratory: Negative for chest tightness, cough, sputum production, bloody sputum, COPD, shortness of breath, stridor, wheezing and asthma.    Gastrointestinal: Negative for nausea and vomiting.   Musculoskeletal: Negative for muscle ache.   Skin: Negative for rash.   Allergic/Immunologic: Negative for seasonal allergies and asthma.   Hematologic/Lymphatic: Negative for swollen lymph nodes.       Objective:      Physical Exam   Constitutional: He is oriented to person, place, and time. He appears well-developed. He is cooperative.  Non-toxic appearance. He does not appear ill. No distress.   HENT:   Head: Normocephalic and atraumatic.   Ears:   Right Ear: Hearing, external ear and ear canal normal.   Left Ear: Hearing, tympanic membrane, external ear and ear canal normal. "      Comments: Positive right TM erythema.  Nose: Nose normal. No mucosal edema, rhinorrhea, nasal deformity or congestion. No epistaxis. Right sinus exhibits no maxillary sinus tenderness and no frontal sinus tenderness. Left sinus exhibits no maxillary sinus tenderness and no frontal sinus tenderness.   Mouth/Throat: Uvula is midline and mucous membranes are normal. No trismus in the jaw. Normal dentition. No uvula swelling. Posterior oropharyngeal erythema present. No oropharyngeal exudate.      Comments: Positive bilateral tonsillar erythema and mild swelling without exudates.    Eyes: Conjunctivae and lids are normal. Right eye exhibits no discharge. Left eye exhibits no discharge. No scleral icterus.   Neck: Trachea normal, normal range of motion, full passive range of motion without pain and phonation normal. Neck supple. No muscular tenderness present.   Cardiovascular: Normal rate, regular rhythm, normal heart sounds and normal pulses.   No murmur heard.  Pulmonary/Chest: Effort normal and breath sounds normal. No stridor. No respiratory distress. He has no wheezes. He has no rhonchi. He has no rales.   Abdominal: Soft. Normal appearance and bowel sounds are normal. He exhibits no distension, no pulsatile midline mass and no mass. There is no abdominal tenderness.   Musculoskeletal: Normal range of motion.         General: No deformity.   Lymphadenopathy:     He has no cervical adenopathy.   Neurological: He is alert and oriented to person, place, and time. He exhibits normal muscle tone. Coordination normal.   Skin: Skin is warm, dry, intact, not diaphoretic and not pale. Psychiatric: His speech is normal and behavior is normal. Judgment and thought content normal.   Nursing note and vitals reviewed.        Assessment:       1. Sore throat    2. Screening for viral disease    3. Otalgia, unspecified laterality    4. Right otitis media, unspecified otitis media type        Plan:         Sore throat  -      POCT Strep A, Molecular    Screening for viral disease  -     POCT COVID-19 Rapid Screening    Otalgia, unspecified laterality    Right otitis media, unspecified otitis media type    Other orders  -     amoxicillin (AMOXIL) 875 MG tablet; Take 1 tablet (875 mg total) by mouth 2 (two) times daily. for 10 days  Dispense: 20 tablet; Refill: 0        PLEASE READ YOUR DISCHARGE INSTRUCTIONS ENTIRELY AS IT CONTAINS IMPORTANT INFORMATION.    Please return here or go to the Emergency Department for any concerns or worsening of condition.    If you were prescribed antibiotics, please take them to completion.      If not allergic, please take over the counter Tylenol (Acetaminophen) and/or Motrin (Ibuprofen) as directed for control of pain and/or fever.  Please follow up with your primary care doctor or specialist as needed.      If you smoke, please stop smoking.    Please return or see your primary care doctor if you develop new or worsening symptoms.     Please arrange follow up with your primary medical clinic as soon as possible. You must understand that you've received an Urgent Care treatment only and that you may be released before all of your medical problems are known or treated. You, the patient, will arrange for follow up as instructed. If your symptoms worsen or fail to improve you should go to the Emergency Room.  Understanding Middle Ear Infections in Children    Middle ear infections are most common in children under age 5. Crankiness, a fever, and tugging at or rubbing the ear may all be signs that your child has a middle ear infection. This is especially true if your child has a cold or other viral illness. It's important to call your healthcare provider if you see these or any of the signs listed below.  Call your child's healthcare provider if you notice any signs of a middle ear infection.   What are middle ear infections?  Middle ear infections occur behind the eardrum. The eardrum is the thin sheet of  tissue that passes sound waves between the outer and middle ear. These infections are usually caused by bacteria or viruses. These are often related to a recent cold or allergy problem.  A blocked tube  In young children, these bacteria or viruses likely reach the middle ear by traveling the short length of the eustachian tube from the back of the nose. Once in the middle ear, they multiply and spread. This irritates delicate tissues lining the middle ear and eustachian tube. If the tube lining swells enough to block off the tube, air pressure drops in the middle ear. This pulls the eardrum inward, making it stiffer and less able to transmit sound.  Fluid buildup causes pain  Once the eustachian tube swells shut, moisture cant drain from the middle ear. Fluid that should flush out the infection builds up in the chamber. This may raise pressure behind the eardrum. This can decrease pain slightly. But if the infection spreads to this fluid, pressure behind the eardrum goes way up. The eardrum is forced outward. It becomes painful, and may break.  Chronic fluid affects hearing  If the eardrum doesnt break and the tube remains blocked, the fluid becomes an ongoing (chronic) condition. As the immediate (acute) infection passes, the middle ear fluid thickens. It becomes sticky and takes up less space. Pressure drops in the middle ear once more. Inward suction stiffens the eardrum. This affects hearing. If the fluid is not removed, the eardrum may be stretched and damaged.  Signs of middle ear problems  · A fever over 100.4°F (38.0°C) and cold symptoms  · Severe ear pain  · Any kind of discharge from the ear  · Ear pain that gets worse or doesnt go away after a few days   When to call your child's healthcare provider  Call your child's healthcare provider's office if your otherwise healthy child has any of the signs or symptoms described below:  · Fever (see Fever and children, below)  · Your child has had a seizure  caused by the fever  · Rapid breathing or shortness of breath  · A stiff neck or headache  · Trouble swallowing  · Your child acts ill after the fever is gone  · Persistent brown, green, or bloody mucus  · Signs of dehydration. These include severe thirst, dark yellow urine, infrequent urination, dull or sunken eyes, dry skin, and dry or cracked lips.  · Your child still doesn't look or act right to you, even after taking a non-aspirin pain reliever  Fever and children  Always use a digital thermometer to check your childs temperature. Never use a mercury thermometer.  For infants and toddlers, be sure to use a rectal thermometer correctly. A rectal thermometer may accidentally poke a hole in (perforate) the rectum. It may also pass on germs from the stool. Always follow the product makers directions for proper use. If you dont feel comfortable taking a rectal temperature, use another method. When you talk to your childs healthcare provider, tell him or her which method you used to take your childs temperature.  Here are guidelines for fever temperature. Ear temperatures arent accurate before 6 months of age. Dont take an oral temperature until your child is at least 4 years old.  Infant under 3 months old:  · Ask your childs healthcare provider how you should take the temperature.  · Rectal or forehead (temporal artery) temperature of 100.4°F (38°C) or higher, or as directed by the provider  · Armpit temperature of 99°F (37.2°C) or higher, or as directed by the provider  Child age 3 to 36 months:  · Rectal, forehead (temporal artery), or ear temperature of 102°F (38.9°C) or higher, or as directed by the provider  · Armpit temperature of 101°F (38.3°C) or higher, or as directed by the provider  Child of any age:  · Repeated temperature of 104°F (40°C) or higher, or as directed by the provider  · Fever that lasts more than 24 hours in a child under 2 years old. Or a fever that lasts for 3 days in a child 2  years or older.   Date Last Reviewed: 11/1/2016 © 2000-2017 [employer]. [address] [place] [address], [place] [address]. All rights reserved. This information is not intended as a substitute for professional medical care. Always follow your healthcare professional's instructions.        When Your Child Has Pharyngitis or Tonsillitis    Your childs throat feels sore. This is likely because of redness and swelling (inflammation) of the throat. Two areas of the throat are most often affected: the pharynx and tonsils. Inflammation of the pharynx (pharyngitis) and inflammation of the tonsils (tonsillitis) are very common in children. This sheet tells you what you can do to relieve your childs throat pain.  What causes pharyngitis or tonsillitis?  Most commonly, pharyngitis and tonsillitis are caused by a viral or bacterial infection.  What are the symptoms of pharyngitis or tonsillitis?  The main symptom of both conditions is a sore throat. Your child may also have a fever, redness or swelling of the throat, and trouble swallowing. You may feel lumps in the neck.  How is pharyngitis or tonsillitis diagnosed?  The healthcare provider will examine your childs throat. The healthcare provider might wipe (swab) your childs throat. This swab will be tested for the bacteria that causes an infection called strep throat. If needed, a blood test can be done to check for a viral infection such as mononucleosis.  How is pharyngitis or tonsillitis treated?  If your childs sore throat is caused by a bacterial infection, the healthcare provider may prescribe antibiotics. Otherwise, you can treat your childs sore throat at home. To do this:  · Give your child acetaminophen or ibuprofen to ease the pain. Don't use ibuprofen in children younger than 6 months of age or in children who are dehydrated or vomiting all of the time. Dont give your child aspirin to relieve a fever. Using aspirin to treat a fever in children  could cause a serious condition called Reye syndrome.  · Give your child cool liquids to drink.  · Have your child gargle with warm saltwater if it helps relieve pain. An over-the-counter throat numbing spray may also help.  What are the long-term concerns?  If your child has frequent sore throats, take him or her to see a healthcare provider. Removing the tonsils may help relieve your childs recurring problems.  When to call your child's healthcare provider  Call your childs healthcare provider right away if your otherwise healthy child has any of the following:  · Fever (see Fever and children, below)  · Sore throat pain that persists for 2 to 3 days  · Sore throat with fever, headache, stomachache, or rash  · Trouble turning or straightening the head  · Problems swallowing or drooling  · Trouble breathing or needing to lean forward to breathe  · Problems opening mouth fully     Fever and children  Always use a digital thermometer to check your childs temperature. Never use a mercury thermometer.  For infants and toddlers, be sure to use a rectal thermometer correctly. A rectal thermometer may accidentally poke a hole in (perforate) the rectum. It may also pass on germs from the stool. Always follow the product makers directions for proper use. If you dont feel comfortable taking a rectal temperature, use another method. When you talk to your childs healthcare provider, tell him or her which method you used to take your childs temperature.  Here are guidelines for fever temperature. Ear temperatures arent accurate before 6 months of age. Dont take an oral temperature until your child is at least 4 years old.  Infant under 3 months old:  · Ask your childs healthcare provider how you should take the temperature.  · Rectal or forehead (temporal artery) temperature of 100.4°F (38°C) or higher, or as directed by the provider  · Armpit temperature of 99°F (37.2°C) or higher, or as directed by the  provider  Child age 3 to 36 months:  · Rectal, forehead (temporal artery), or ear temperature of 102°F (38.9°C) or higher, or as directed by the provider  · Armpit temperature of 101°F (38.3°C) or higher, or as directed by the provider  Child of any age:  · Repeated temperature of 104°F (40°C) or higher, or as directed by the provider  · Fever that lasts more than 24 hours in a child under 2 years old. Or a fever that lasts for 3 days in a child 2 years or older.   Date Last Reviewed: 11/1/2016  © 5301-6199 [employer]. [address] [place] [address], [place] [address]. All rights reserved. This information is not intended as a substitute for professional medical care. Always follow your healthcare professional's instructions.        Self-Care for Sore Throats    Sore throats happen for many reasons, such as colds, allergies, and infections caused by viruses or bacteria. In any case, your throat becomes red and sore. Your goal for self-care is to reduce your discomfort while giving your throat a chance to heal.  Moisten and soothe your throat  Tips include the following:  · Try a sip of water first thing after waking up.  · Keep your throat moist by drinking 6 or more glasses of clear liquids every day.  · Run a cool-air humidifier in your room overnight.  · Avoid cigarette smoke.   · Suck on throat lozenges, cough drops, hard candy, ice chips, or frozen fruit-juice bars. Use the sugar-free versions if your diet or medical condition requires them.  Gargle to ease irritation  Gargling every hour or 2 can ease irritation. Try gargling with 1 of these solutions:  · 1/4 teaspoon of salt in 1/2 cup of warm water  · An over-the-counter anesthetic gargle  Use medicine for more relief  Over-the-counter medicine can reduce sore throat symptoms. Ask your pharmacist if you have questions about which medicine to use:  · Ease pain with anesthetic sprays. Aspirin or an aspirin substitute also helps. Remember, never give  aspirin to anyone 18 or younger, or if you are already taking blood thinners.   · For sore throats caused by allergies, try antihistamines to block the allergic reaction.  · Remember: unless a sore throat is caused by a bacterial infection, antibiotics wont help you.  Prevent future sore throats  Prevention tips include the following:  · Stop smoking or reduce contact with secondhand smoke. Smoke irritates the tender throat lining.  · Limit contact with pets and with allergy-causing substances, such as pollen and mold.  · When youre around someone with a sore throat or cold, wash your hands often to keep viruses or bacteria from spreading.  · Dont strain your vocal cords.  Call your healthcare provider  Contact your healthcare provider if you have:  · A temperature over 101°F (38.3°C)  · White spots on the throat  · Great difficulty swallowing  · Trouble breathing  · A skin rash  · Recent exposure to someone else with strep bacteria  · Severe hoarseness and swollen glands in the neck or jaw   Date Last Reviewed: 8/1/2016 © 2000-2017 [employer]. [address] [place] [address]. All rights reserved. This information is not intended as a substitute for professional medical care. Always follow your healthcare professional's instructions.    You have tested negative for COVID-19 today.  If you did not have any close exposure as defined below, then effective today, you can return to your normal daily activities including social distancing, wearing masks, and frequent handwashing.    A close exposure is defined as anyone who had a masked or an unmasked exposure to a known COVID -19 positive person, at less than 6 ft for more than 15 minutes.  If your exposure meets this definition, then you are required to quarantine for 14 days per the CDC.    The 14 day quarantine begins from the day you were exposed, not the day of your test.  For example, if your exposure was on a Monday, and you waited  until Friday of the same week to get tested and it was negative, your 14 day quarantine begins from that Monday, not the Friday you tested negative.    If you developed symptoms since the exposure, and your test was negative today, you still have to quarantine for 14 days from the date of the exposure.    So if you meet the definition of a close exposure, A NEGATIVE TEST DOES NOT GET YOU OUT OF 14 DAYS OF QUARANTINE!

## 2020-12-24 NOTE — PATIENT INSTRUCTIONS
PLEASE READ YOUR DISCHARGE INSTRUCTIONS ENTIRELY AS IT CONTAINS IMPORTANT INFORMATION.    Please return here or go to the Emergency Department for any concerns or worsening of condition.    If you were prescribed antibiotics, please take them to completion.      If not allergic, please take over the counter Tylenol (Acetaminophen) and/or Motrin (Ibuprofen) as directed for control of pain and/or fever.  Please follow up with your primary care doctor or specialist as needed.      If you smoke, please stop smoking.    Please return or see your primary care doctor if you develop new or worsening symptoms.     Please arrange follow up with your primary medical clinic as soon as possible. You must understand that you've received an Urgent Care treatment only and that you may be released before all of your medical problems are known or treated. You, the patient, will arrange for follow up as instructed. If your symptoms worsen or fail to improve you should go to the Emergency Room.  Understanding Middle Ear Infections in Children    Middle ear infections are most common in children under age 5. Crankiness, a fever, and tugging at or rubbing the ear may all be signs that your child has a middle ear infection. This is especially true if your child has a cold or other viral illness. It's important to call your healthcare provider if you see these or any of the signs listed below.  Call your child's healthcare provider if you notice any signs of a middle ear infection.   What are middle ear infections?  Middle ear infections occur behind the eardrum. The eardrum is the thin sheet of tissue that passes sound waves between the outer and middle ear. These infections are usually caused by bacteria or viruses. These are often related to a recent cold or allergy problem.  A blocked tube  In young children, these bacteria or viruses likely reach the middle ear by traveling the short length of the eustachian tube from the back of the  nose. Once in the middle ear, they multiply and spread. This irritates delicate tissues lining the middle ear and eustachian tube. If the tube lining swells enough to block off the tube, air pressure drops in the middle ear. This pulls the eardrum inward, making it stiffer and less able to transmit sound.  Fluid buildup causes pain  Once the eustachian tube swells shut, moisture cant drain from the middle ear. Fluid that should flush out the infection builds up in the chamber. This may raise pressure behind the eardrum. This can decrease pain slightly. But if the infection spreads to this fluid, pressure behind the eardrum goes way up. The eardrum is forced outward. It becomes painful, and may break.  Chronic fluid affects hearing  If the eardrum doesnt break and the tube remains blocked, the fluid becomes an ongoing (chronic) condition. As the immediate (acute) infection passes, the middle ear fluid thickens. It becomes sticky and takes up less space. Pressure drops in the middle ear once more. Inward suction stiffens the eardrum. This affects hearing. If the fluid is not removed, the eardrum may be stretched and damaged.  Signs of middle ear problems  · A fever over 100.4°F (38.0°C) and cold symptoms  · Severe ear pain  · Any kind of discharge from the ear  · Ear pain that gets worse or doesnt go away after a few days   When to call your child's healthcare provider  Call your child's healthcare provider's office if your otherwise healthy child has any of the signs or symptoms described below:  · Fever (see Fever and children, below)  · Your child has had a seizure caused by the fever  · Rapid breathing or shortness of breath  · A stiff neck or headache  · Trouble swallowing  · Your child acts ill after the fever is gone  · Persistent brown, green, or bloody mucus  · Signs of dehydration. These include severe thirst, dark yellow urine, infrequent urination, dull or sunken eyes, dry skin, and dry or cracked  lips.  · Your child still doesn't look or act right to you, even after taking a non-aspirin pain reliever  Fever and children  Always use a digital thermometer to check your childs temperature. Never use a mercury thermometer.  For infants and toddlers, be sure to use a rectal thermometer correctly. A rectal thermometer may accidentally poke a hole in (perforate) the rectum. It may also pass on germs from the stool. Always follow the product makers directions for proper use. If you dont feel comfortable taking a rectal temperature, use another method. When you talk to your childs healthcare provider, tell him or her which method you used to take your childs temperature.  Here are guidelines for fever temperature. Ear temperatures arent accurate before 6 months of age. Dont take an oral temperature until your child is at least 4 years old.  Infant under 3 months old:  · Ask your childs healthcare provider how you should take the temperature.  · Rectal or forehead (temporal artery) temperature of 100.4°F (38°C) or higher, or as directed by the provider  · Armpit temperature of 99°F (37.2°C) or higher, or as directed by the provider  Child age 3 to 36 months:  · Rectal, forehead (temporal artery), or ear temperature of 102°F (38.9°C) or higher, or as directed by the provider  · Armpit temperature of 101°F (38.3°C) or higher, or as directed by the provider  Child of any age:  · Repeated temperature of 104°F (40°C) or higher, or as directed by the provider  · Fever that lasts more than 24 hours in a child under 2 years old. Or a fever that lasts for 3 days in a child 2 years or older.   Date Last Reviewed: 11/1/2016  © 5188-9968 [email or web address]. [address] [place] [address], [place] [address]. All rights reserved. This information is not intended as a substitute for professional medical care. Always follow your healthcare professional's instructions.        When Your Child Has Pharyngitis or  Tonsillitis    Your childs throat feels sore. This is likely because of redness and swelling (inflammation) of the throat. Two areas of the throat are most often affected: the pharynx and tonsils. Inflammation of the pharynx (pharyngitis) and inflammation of the tonsils (tonsillitis) are very common in children. This sheet tells you what you can do to relieve your childs throat pain.  What causes pharyngitis or tonsillitis?  Most commonly, pharyngitis and tonsillitis are caused by a viral or bacterial infection.  What are the symptoms of pharyngitis or tonsillitis?  The main symptom of both conditions is a sore throat. Your child may also have a fever, redness or swelling of the throat, and trouble swallowing. You may feel lumps in the neck.  How is pharyngitis or tonsillitis diagnosed?  The healthcare provider will examine your childs throat. The healthcare provider might wipe (swab) your childs throat. This swab will be tested for the bacteria that causes an infection called strep throat. If needed, a blood test can be done to check for a viral infection such as mononucleosis.  How is pharyngitis or tonsillitis treated?  If your childs sore throat is caused by a bacterial infection, the healthcare provider may prescribe antibiotics. Otherwise, you can treat your childs sore throat at home. To do this:  · Give your child acetaminophen or ibuprofen to ease the pain. Don't use ibuprofen in children younger than 6 months of age or in children who are dehydrated or vomiting all of the time. Dont give your child aspirin to relieve a fever. Using aspirin to treat a fever in children could cause a serious condition called Reye syndrome.  · Give your child cool liquids to drink.  · Have your child gargle with warm saltwater if it helps relieve pain. An over-the-counter throat numbing spray may also help.  What are the long-term concerns?  If your child has frequent sore throats, take him or her to see a healthcare  provider. Removing the tonsils may help relieve your childs recurring problems.  When to call your child's healthcare provider  Call your childs healthcare provider right away if your otherwise healthy child has any of the following:  · Fever (see Fever and children, below)  · Sore throat pain that persists for 2 to 3 days  · Sore throat with fever, headache, stomachache, or rash  · Trouble turning or straightening the head  · Problems swallowing or drooling  · Trouble breathing or needing to lean forward to breathe  · Problems opening mouth fully     Fever and children  Always use a digital thermometer to check your childs temperature. Never use a mercury thermometer.  For infants and toddlers, be sure to use a rectal thermometer correctly. A rectal thermometer may accidentally poke a hole in (perforate) the rectum. It may also pass on germs from the stool. Always follow the product makers directions for proper use. If you dont feel comfortable taking a rectal temperature, use another method. When you talk to your childs healthcare provider, tell him or her which method you used to take your childs temperature.  Here are guidelines for fever temperature. Ear temperatures arent accurate before 6 months of age. Dont take an oral temperature until your child is at least 4 years old.  Infant under 3 months old:  · Ask your childs healthcare provider how you should take the temperature.  · Rectal or forehead (temporal artery) temperature of 100.4°F (38°C) or higher, or as directed by the provider  · Armpit temperature of 99°F (37.2°C) or higher, or as directed by the provider  Child age 3 to 36 months:  · Rectal, forehead (temporal artery), or ear temperature of 102°F (38.9°C) or higher, or as directed by the provider  · Armpit temperature of 101°F (38.3°C) or higher, or as directed by the provider  Child of any age:  · Repeated temperature of 104°F (40°C) or higher, or as directed by the provider  · Fever  that lasts more than 24 hours in a child under 2 years old. Or a fever that lasts for 3 days in a child 2 years or older.   Date Last Reviewed: 11/1/2016 © 2000-2017 [employer]. [address] [place] [address], [place] [address]. All rights reserved. This information is not intended as a substitute for professional medical care. Always follow your healthcare professional's instructions.        Self-Care for Sore Throats    Sore throats happen for many reasons, such as colds, allergies, and infections caused by viruses or bacteria. In any case, your throat becomes red and sore. Your goal for self-care is to reduce your discomfort while giving your throat a chance to heal.  Moisten and soothe your throat  Tips include the following:  · Try a sip of water first thing after waking up.  · Keep your throat moist by drinking 6 or more glasses of clear liquids every day.  · Run a cool-air humidifier in your room overnight.  · Avoid cigarette smoke.   · Suck on throat lozenges, cough drops, hard candy, ice chips, or frozen fruit-juice bars. Use the sugar-free versions if your diet or medical condition requires them.  Gargle to ease irritation  Gargling every hour or 2 can ease irritation. Try gargling with 1 of these solutions:  · 1/4 teaspoon of salt in 1/2 cup of warm water  · An over-the-counter anesthetic gargle  Use medicine for more relief  Over-the-counter medicine can reduce sore throat symptoms. Ask your pharmacist if you have questions about which medicine to use:  · Ease pain with anesthetic sprays. Aspirin or an aspirin substitute also helps. Remember, never give aspirin to anyone 18 or younger, or if you are already taking blood thinners.   · For sore throats caused by allergies, try antihistamines to block the allergic reaction.  · Remember: unless a sore throat is caused by a bacterial infection, antibiotics wont help you.  Prevent future sore throats  Prevention tips include the following:  · Stop  smoking or reduce contact with secondhand smoke. Smoke irritates the tender throat lining.  · Limit contact with pets and with allergy-causing substances, such as pollen and mold.  · When youre around someone with a sore throat or cold, wash your hands often to keep viruses or bacteria from spreading.  · Dont strain your vocal cords.  Call your healthcare provider  Contact your healthcare provider if you have:  · A temperature over 101°F (38.3°C)  · White spots on the throat  · Great difficulty swallowing  · Trouble breathing  · A skin rash  · Recent exposure to someone else with strep bacteria  · Severe hoarseness and swollen glands in the neck or jaw   Date Last Reviewed: 8/1/2016  © 2197-9062 [employer]. [address] [place] [address], [place] [address]. All rights reserved. This information is not intended as a substitute for professional medical care. Always follow your healthcare professional's instructions.

## 2020-12-25 ENCOUNTER — TELEPHONE (OUTPATIENT)
Dept: URGENT CARE | Facility: CLINIC | Age: 25
End: 2020-12-25

## 2021-01-02 ENCOUNTER — OFFICE VISIT (OUTPATIENT)
Dept: URGENT CARE | Facility: CLINIC | Age: 26
End: 2021-01-02
Payer: COMMERCIAL

## 2021-01-02 VITALS
HEART RATE: 114 BPM | WEIGHT: 185 LBS | TEMPERATURE: 100 F | HEIGHT: 69 IN | RESPIRATION RATE: 18 BRPM | OXYGEN SATURATION: 97 % | DIASTOLIC BLOOD PRESSURE: 92 MMHG | SYSTOLIC BLOOD PRESSURE: 137 MMHG | BODY MASS INDEX: 27.4 KG/M2

## 2021-01-02 DIAGNOSIS — R00.0 TACHYCARDIA: ICD-10-CM

## 2021-01-02 DIAGNOSIS — R50.9 FEVER, UNSPECIFIED FEVER CAUSE: Primary | ICD-10-CM

## 2021-01-02 DIAGNOSIS — B34.9 VIRAL SYNDROME: ICD-10-CM

## 2021-01-02 LAB
CTP QC/QA: YES
CTP QC/QA: YES
POC MOLECULAR INFLUENZA A AGN: NEGATIVE
POC MOLECULAR INFLUENZA B AGN: NEGATIVE
SARS-COV-2 RDRP RESP QL NAA+PROBE: NEGATIVE

## 2021-01-02 PROCEDURE — 99214 PR OFFICE/OUTPT VISIT, EST, LEVL IV, 30-39 MIN: ICD-10-PCS | Mod: S$GLB,,, | Performed by: STUDENT IN AN ORGANIZED HEALTH CARE EDUCATION/TRAINING PROGRAM

## 2021-01-02 PROCEDURE — U0002: ICD-10-PCS | Mod: QW,S$GLB,, | Performed by: STUDENT IN AN ORGANIZED HEALTH CARE EDUCATION/TRAINING PROGRAM

## 2021-01-02 PROCEDURE — 87502 POCT INFLUENZA A/B MOLECULAR: ICD-10-PCS | Mod: QW,S$GLB,, | Performed by: STUDENT IN AN ORGANIZED HEALTH CARE EDUCATION/TRAINING PROGRAM

## 2021-01-02 PROCEDURE — 3008F PR BODY MASS INDEX (BMI) DOCUMENTED: ICD-10-PCS | Mod: CPTII,S$GLB,, | Performed by: STUDENT IN AN ORGANIZED HEALTH CARE EDUCATION/TRAINING PROGRAM

## 2021-01-02 PROCEDURE — 87502 INFLUENZA DNA AMP PROBE: CPT | Mod: QW,S$GLB,, | Performed by: STUDENT IN AN ORGANIZED HEALTH CARE EDUCATION/TRAINING PROGRAM

## 2021-01-02 PROCEDURE — U0002 COVID-19 LAB TEST NON-CDC: HCPCS | Mod: QW,S$GLB,, | Performed by: STUDENT IN AN ORGANIZED HEALTH CARE EDUCATION/TRAINING PROGRAM

## 2021-01-02 PROCEDURE — 3008F BODY MASS INDEX DOCD: CPT | Mod: CPTII,S$GLB,, | Performed by: STUDENT IN AN ORGANIZED HEALTH CARE EDUCATION/TRAINING PROGRAM

## 2021-01-02 PROCEDURE — 99214 OFFICE O/P EST MOD 30 MIN: CPT | Mod: S$GLB,,, | Performed by: STUDENT IN AN ORGANIZED HEALTH CARE EDUCATION/TRAINING PROGRAM

## 2021-01-09 ENCOUNTER — OFFICE VISIT (OUTPATIENT)
Dept: URGENT CARE | Facility: CLINIC | Age: 26
End: 2021-01-09
Payer: COMMERCIAL

## 2021-01-09 VITALS
BODY MASS INDEX: 27.39 KG/M2 | OXYGEN SATURATION: 97 % | SYSTOLIC BLOOD PRESSURE: 126 MMHG | DIASTOLIC BLOOD PRESSURE: 81 MMHG | HEIGHT: 69 IN | TEMPERATURE: 99 F | HEART RATE: 100 BPM | WEIGHT: 184.94 LBS

## 2021-01-09 DIAGNOSIS — U07.1 COVID-19 VIRUS INFECTION: Primary | ICD-10-CM

## 2021-01-09 DIAGNOSIS — U07.1 COVID-19 VIRUS DETECTED: ICD-10-CM

## 2021-01-09 LAB
CTP QC/QA: YES
SARS-COV-2 RDRP RESP QL NAA+PROBE: POSITIVE

## 2021-01-09 PROCEDURE — 3008F BODY MASS INDEX DOCD: CPT | Mod: CPTII,S$GLB,, | Performed by: PHYSICIAN ASSISTANT

## 2021-01-09 PROCEDURE — 99213 OFFICE O/P EST LOW 20 MIN: CPT | Mod: S$GLB,,, | Performed by: PHYSICIAN ASSISTANT

## 2021-01-09 PROCEDURE — U0002 COVID-19 LAB TEST NON-CDC: HCPCS | Mod: QW,S$GLB,, | Performed by: PHYSICIAN ASSISTANT

## 2021-01-09 PROCEDURE — U0002: ICD-10-PCS | Mod: QW,S$GLB,, | Performed by: PHYSICIAN ASSISTANT

## 2021-01-09 PROCEDURE — 99213 PR OFFICE/OUTPT VISIT, EST, LEVL III, 20-29 MIN: ICD-10-PCS | Mod: S$GLB,,, | Performed by: PHYSICIAN ASSISTANT

## 2021-01-09 PROCEDURE — 3008F PR BODY MASS INDEX (BMI) DOCUMENTED: ICD-10-PCS | Mod: CPTII,S$GLB,, | Performed by: PHYSICIAN ASSISTANT

## 2022-04-25 ENCOUNTER — LAB VISIT (OUTPATIENT)
Dept: LAB | Facility: HOSPITAL | Age: 27
End: 2022-04-25
Payer: COMMERCIAL

## 2022-04-25 ENCOUNTER — OFFICE VISIT (OUTPATIENT)
Dept: ALLERGY | Facility: CLINIC | Age: 27
End: 2022-04-25
Payer: COMMERCIAL

## 2022-04-25 VITALS — HEIGHT: 69 IN | WEIGHT: 202.19 LBS | BODY MASS INDEX: 29.95 KG/M2

## 2022-04-25 DIAGNOSIS — K21.9 GASTROESOPHAGEAL REFLUX DISEASE, UNSPECIFIED WHETHER ESOPHAGITIS PRESENT: ICD-10-CM

## 2022-04-25 DIAGNOSIS — J31.0 CHRONIC RHINITIS: ICD-10-CM

## 2022-04-25 DIAGNOSIS — J31.0 CHRONIC RHINITIS: Primary | ICD-10-CM

## 2022-04-25 PROCEDURE — 86003 ALLG SPEC IGE CRUDE XTRC EA: CPT | Mod: 59 | Performed by: STUDENT IN AN ORGANIZED HEALTH CARE EDUCATION/TRAINING PROGRAM

## 2022-04-25 PROCEDURE — 99204 PR OFFICE/OUTPT VISIT, NEW, LEVL IV, 45-59 MIN: ICD-10-PCS | Mod: S$GLB,,, | Performed by: STUDENT IN AN ORGANIZED HEALTH CARE EDUCATION/TRAINING PROGRAM

## 2022-04-25 PROCEDURE — 86003 ALLG SPEC IGE CRUDE XTRC EA: CPT | Performed by: STUDENT IN AN ORGANIZED HEALTH CARE EDUCATION/TRAINING PROGRAM

## 2022-04-25 PROCEDURE — 99999 PR PBB SHADOW E&M-EST. PATIENT-LVL III: ICD-10-PCS | Mod: PBBFAC,,, | Performed by: STUDENT IN AN ORGANIZED HEALTH CARE EDUCATION/TRAINING PROGRAM

## 2022-04-25 PROCEDURE — 36415 COLL VENOUS BLD VENIPUNCTURE: CPT | Mod: PN | Performed by: STUDENT IN AN ORGANIZED HEALTH CARE EDUCATION/TRAINING PROGRAM

## 2022-04-25 PROCEDURE — 1159F PR MEDICATION LIST DOCUMENTED IN MEDICAL RECORD: ICD-10-PCS | Mod: CPTII,S$GLB,, | Performed by: STUDENT IN AN ORGANIZED HEALTH CARE EDUCATION/TRAINING PROGRAM

## 2022-04-25 PROCEDURE — 1159F MED LIST DOCD IN RCRD: CPT | Mod: CPTII,S$GLB,, | Performed by: STUDENT IN AN ORGANIZED HEALTH CARE EDUCATION/TRAINING PROGRAM

## 2022-04-25 PROCEDURE — 99999 PR PBB SHADOW E&M-EST. PATIENT-LVL III: CPT | Mod: PBBFAC,,, | Performed by: STUDENT IN AN ORGANIZED HEALTH CARE EDUCATION/TRAINING PROGRAM

## 2022-04-25 PROCEDURE — 3008F PR BODY MASS INDEX (BMI) DOCUMENTED: ICD-10-PCS | Mod: CPTII,S$GLB,, | Performed by: STUDENT IN AN ORGANIZED HEALTH CARE EDUCATION/TRAINING PROGRAM

## 2022-04-25 PROCEDURE — 99204 OFFICE O/P NEW MOD 45 MIN: CPT | Mod: S$GLB,,, | Performed by: STUDENT IN AN ORGANIZED HEALTH CARE EDUCATION/TRAINING PROGRAM

## 2022-04-25 PROCEDURE — 3008F BODY MASS INDEX DOCD: CPT | Mod: CPTII,S$GLB,, | Performed by: STUDENT IN AN ORGANIZED HEALTH CARE EDUCATION/TRAINING PROGRAM

## 2022-04-25 RX ORDER — IPRATROPIUM BROMIDE 42 UG/1
2 SPRAY, METERED NASAL 3 TIMES DAILY PRN
Qty: 15 ML | Refills: 5 | Status: SHIPPED | OUTPATIENT
Start: 2022-04-25

## 2022-04-25 RX ORDER — SERTRALINE HYDROCHLORIDE 100 MG/1
100 TABLET, FILM COATED ORAL DAILY
COMMUNITY
Start: 2022-04-06

## 2022-04-25 RX ORDER — OMEPRAZOLE 10 MG/1
10 CAPSULE, DELAYED RELEASE ORAL DAILY
COMMUNITY

## 2022-04-25 RX ORDER — FLUTICASONE PROPIONATE 50 MCG
SPRAY, SUSPENSION (ML) NASAL
Qty: 16 G | Refills: 11 | Status: SHIPPED | OUTPATIENT
Start: 2022-04-25

## 2022-04-25 RX ORDER — AMLODIPINE BESYLATE 2.5 MG/1
2.5 TABLET ORAL DAILY
COMMUNITY
Start: 2022-04-06

## 2022-04-25 RX ORDER — LORATADINE 10 MG/1
10 TABLET ORAL DAILY
COMMUNITY

## 2022-04-25 NOTE — PROGRESS NOTES
ALLERGY & IMMUNOLOGY CLINIC - INITIAL CONSULTATION      HISTORY OF PRESENT ILLNESS     Patient ID: Meet Gilliam is a 26 y.o. male    CC: chronic rhinitis     HPI: Meet Gilliam is a 26 y.o. male presenting for chronic rhinitis     Symptoms started when he went to college in [facility] and worsened when he moved here 2 years ago. He is form [place].  Symptoms start with nasal pruritis, then a lot of mucus production and runny nose. Phlegm stuck in throat. Sometimes he can cough it out. He doesn't like having to cough up mucus in public, but he feels like he can't swallow it.  Patient endorses sneezing, rhinorrhea, post nasal drip, nasal pruritus.  Patient denies congestion, ocular pruritus.  Symptoms are perennial. When he was in [place], he thinks it was more seasonal (Spring and Fall).  Symptoms occur every day. Symptoms still present even when he travels.  There is no difference between indoors and outdoors.  The patient has found triggers to include grass. He wonders if his cat could be contributing to his symptoms.   The patient denies anosmia.   The patient endorses heartburn/reflux symptoms. He takes omeprazole every day which helps. He thinks reflux could be contributing to his rhinitis symptoms.    He uses claritin every day. It helps a lot with the itchy nose, but less so with mucus production. Mucinex has not helped. He has tried sudafed when he was sick, it didn't help. He tried flonase 2 SEN daily for about a month. It helped with congestion and itchy nose, but he still felt like he still had a lot of mucus in his throat.    He has never had allergy testing before.    When he drinks certain alcohol, he says his face feels flushed, there might be a patch where it feels raised and itchy. He says the bumps are small, maybe feels like a heat rash. It stays around for 30 minutes. Occurs when he drinks certain beer or wine, especially if he is eating with it. When it happens, he just ignores it  and it goes away on its own. Doesn't happen with hard liquor. His mother gets the same thing, but worse than he does. This does not happen often. It does not happen every time he drinks beer or wine.     REVIEW OF SYSTEMS     CONST: no F/C/NS, no unexplained weight changes  EYES:  no discharge, no pruritus  NOSE: no congestion, + rhinorrhea, + itching, + sneezing, no anosmia  PULM: no wheezing, + cough  GI: no dysphagia, + heartburn, no pain, no N/V/D  DERM: see HPI     MEDICAL HISTORY     Vaccines:   Immunization History   Administered Date(s) Administered    Influenza - Quadrivalent - PF *Preferred* (6 months and older) 10/07/2020       MedHx:   Patient Active Problem List   Diagnosis    COVID-19 virus infection       SurgHx:   History reviewed. No pertinent surgical history.    Medications:   Current Outpatient Medications on File Prior to Visit   Medication Sig Dispense Refill    amLODIPine (NORVASC) 2.5 MG tablet Take 2.5 mg by mouth once daily.      loratadine (CLARITIN) 10 mg tablet Take 10 mg by mouth once daily.      omeprazole (PRILOSEC) 10 MG capsule Take 10 mg by mouth once daily.      sertraline (ZOLOFT) 100 MG tablet Take 100 mg by mouth once daily.      [DISCONTINUED] (Magic mouthwash) 1:1:1 Benadryl 12.5mg/5ml liq, aluminum & magnesium hydroxide-simehticone (Maalox), lidocaine viscous 2% Swish and spit 10 mLs every 4 (four) hours as needed. for mouth sores (Patient not taking: Reported on 10/14/2020) 200 mL 0    [DISCONTINUED] mupirocin (BACTROBAN) 2 % ointment Apply to affected area 3 times daily (Patient not taking: Reported on 1/9/2021) 22 g 1     No current facility-administered medications on file prior to visit.       H/o Asthma: denies  H/o Eczema: denies  H/o Rhinitis: endorses  Oral Allergy: pineapple, walnut  Food Allergy: denies  Venom Allergy: denies    Drug Allergies: Review of patient's allergies indicates:  No Known Allergies     Env/Occ:   Pets: cat, might trigger  "symptoms  Occupation:     Infection Hx: Prior to covid pandemic, would get strep throat about twice per year. Otherwise denies frequent infections requiring antibiotics. No history of pneumonia.    SocHx:   ETOH: yes  Tobacco: no  Illicit Drug Use: no  Social History     Tobacco Use    Smoking status: Never Smoker    Smokeless tobacco: Never Used   Substance Use Topics    Alcohol use: Yes     Comment: social        FamHx:   Asthma: no  Allergic rhinitis: mother, father, and sister have all been on allergy shots  Food Allergy: no  Family History   Problem Relation Age of Onset    Allergies Mother     Allergies Father     Allergies Sister     Asthma Neg Hx         PHYSICAL EXAM     VS: Ht 5' 9.02" (1.753 m)   Wt 91.7 kg (202 lb 2.6 oz)   BMI 29.84 kg/m²   GENERAL: Alert, NAD, well-appearing, cooperative  EYES: EOMI, no conjunctival injection, no discharge, no infraorbital shiners  EARS: external auditory canals normal B/L, TM normal B/L  NOSE: NT 2-3 + B/L, no stringing mucous, no polyps visualized  ORAL: MMM, no ulcers, no thrush, no cobblestoning  NECK: no thyromegaly, no LAD  LUNGS: CTAB, no w/r/c, no increased WOB  HEART: RRR, normal S1/S2, no m/g/r  EXTREMITIES: No LE edema  DERM: no rashes, no skin breaks  NEURO: normal speech, normal gait, no facial asymmetry     LABORATORY STUDIES     No pertinent labs for this visit.     ALLERGEN TESTING     Immunocaps: ordered     ASSESSMENT & PLAN     Meet Gilliam is a 26 y.o. male with     # Chronic rhinitis: main symptoms are nasal pruritus, rhinorrhea, and sensation of mucus stuck in throat. Claritin and flonase help with some of the symptoms, but not with the sensation of mucus in his throat. Will do allergy testing. Discussed that some of his symptoms may be due to allergy and others not. He thinks reflux could be contributing to symptoms.  -immunocaps for aeroallergens ordered  -referring to ENT for eval of LPR  -continue daily " claritin  -restart flonase 2 SEN once to twice daily  -start ipratropium nasal spray 0.06% TID prn    # GERD:  -continue omeprazole  -referring to ENT for eval of LPR    # Facial swelling and rash with beer and wine: Lasts 30 minutes. Worse when he has food with it. Does not happen every time he has beer or wine. Does not happen with hard liquor. Unclear etiology. Low suspicion for aldehyde dehydrogenase deficiency as it doesn't happen with hard liquor. Maybe there is a component of alcohol-induced urticaria. Recommended he try taking an extra claritin prior to drinking beer or wine to try and prevent it.    Follow up: 2 months      Zachary Carolina MD  Allergy/Immunology

## 2022-04-28 LAB
A ALTERNATA IGE QN: <0.1 KU/L
A FUMIGATUS IGE QN: <0.1 KU/L
BERMUDA GRASS IGE QN: <0.1 KU/L
CAT DANDER IGE QN: <0.1 KU/L
CEDAR IGE QN: <0.1 KU/L
D FARINAE IGE QN: <0.1 KU/L
D PTERONYSS IGE QN: <0.1 KU/L
DEPRECATED A ALTERNATA IGE RAST QL: NORMAL
DEPRECATED A FUMIGATUS IGE RAST QL: NORMAL
DEPRECATED BERMUDA GRASS IGE RAST QL: NORMAL
DEPRECATED CAT DANDER IGE RAST QL: NORMAL
DEPRECATED CEDAR IGE RAST QL: NORMAL
DEPRECATED D FARINAE IGE RAST QL: NORMAL
DEPRECATED D PTERONYSS IGE RAST QL: NORMAL
DEPRECATED DOG DANDER IGE RAST QL: NORMAL
DEPRECATED ELDER IGE RAST QL: NORMAL
DEPRECATED ENGL PLANTAIN IGE RAST QL: NORMAL
DEPRECATED PECAN/HICK TREE IGE RAST QL: NORMAL
DEPRECATED ROACH IGE RAST QL: NORMAL
DEPRECATED TIMOTHY IGE RAST QL: NORMAL
DEPRECATED WEST RAGWEED IGE RAST QL: NORMAL
DEPRECATED WHITE OAK IGE RAST QL: NORMAL
DOG DANDER IGE QN: <0.1 KU/L
ELDER IGE QN: <0.1 KU/L
ENGL PLANTAIN IGE QN: <0.1 KU/L
PECAN/HICK TREE IGE QN: <0.1 KU/L
ROACH IGE QN: <0.1 KU/L
TIMOTHY IGE QN: <0.1 KU/L
WEST RAGWEED IGE QN: <0.1 KU/L
WHITE OAK IGE QN: <0.1 KU/L

## 2022-04-29 ENCOUNTER — TELEPHONE (OUTPATIENT)
Dept: ALLERGY | Facility: CLINIC | Age: 27
End: 2022-04-29
Payer: COMMERCIAL

## 2022-04-29 NOTE — TELEPHONE ENCOUNTER
----- Message from Zachary Carolina MD sent at 4/28/2022  5:07 PM CDT -----  Regarding: call with results  Hello,  This patient does not have the portal. Can someone please call him to let him know his allergy lab testing was negative for evidence of allergy to all inhalents tested.    Thanks,  Zachary Carolina MD  Allergy/Immunology

## 2022-05-10 ENCOUNTER — OFFICE VISIT (OUTPATIENT)
Dept: OTOLARYNGOLOGY | Facility: CLINIC | Age: 27
End: 2022-05-10
Payer: COMMERCIAL

## 2022-05-10 VITALS — HEART RATE: 94 BPM | TEMPERATURE: 98 F | DIASTOLIC BLOOD PRESSURE: 83 MMHG | SYSTOLIC BLOOD PRESSURE: 119 MMHG

## 2022-05-10 DIAGNOSIS — K21.9 GASTROESOPHAGEAL REFLUX DISEASE, UNSPECIFIED WHETHER ESOPHAGITIS PRESENT: ICD-10-CM

## 2022-05-10 DIAGNOSIS — J31.0 CHRONIC RHINITIS: ICD-10-CM

## 2022-05-10 DIAGNOSIS — J03.91 RECURRENT TONSILLITIS: Primary | ICD-10-CM

## 2022-05-10 PROCEDURE — 3074F PR MOST RECENT SYSTOLIC BLOOD PRESSURE < 130 MM HG: ICD-10-PCS | Mod: CPTII,S$GLB,, | Performed by: OTOLARYNGOLOGY

## 2022-05-10 PROCEDURE — 1160F RVW MEDS BY RX/DR IN RCRD: CPT | Mod: CPTII,S$GLB,, | Performed by: OTOLARYNGOLOGY

## 2022-05-10 PROCEDURE — 1159F MED LIST DOCD IN RCRD: CPT | Mod: CPTII,S$GLB,, | Performed by: OTOLARYNGOLOGY

## 2022-05-10 PROCEDURE — 99204 OFFICE O/P NEW MOD 45 MIN: CPT | Mod: 25,S$GLB,, | Performed by: OTOLARYNGOLOGY

## 2022-05-10 PROCEDURE — 1159F PR MEDICATION LIST DOCUMENTED IN MEDICAL RECORD: ICD-10-PCS | Mod: CPTII,S$GLB,, | Performed by: OTOLARYNGOLOGY

## 2022-05-10 PROCEDURE — 99204 PR OFFICE/OUTPT VISIT, NEW, LEVL IV, 45-59 MIN: ICD-10-PCS | Mod: 25,S$GLB,, | Performed by: OTOLARYNGOLOGY

## 2022-05-10 PROCEDURE — 3074F SYST BP LT 130 MM HG: CPT | Mod: CPTII,S$GLB,, | Performed by: OTOLARYNGOLOGY

## 2022-05-10 PROCEDURE — 3079F DIAST BP 80-89 MM HG: CPT | Mod: CPTII,S$GLB,, | Performed by: OTOLARYNGOLOGY

## 2022-05-10 PROCEDURE — 1160F PR REVIEW ALL MEDS BY PRESCRIBER/CLIN PHARMACIST DOCUMENTED: ICD-10-PCS | Mod: CPTII,S$GLB,, | Performed by: OTOLARYNGOLOGY

## 2022-05-10 PROCEDURE — 31575 PR LARYNGOSCOPY, FLEXIBLE; DIAGNOSTIC: ICD-10-PCS | Mod: S$GLB,,, | Performed by: OTOLARYNGOLOGY

## 2022-05-10 PROCEDURE — 31575 DIAGNOSTIC LARYNGOSCOPY: CPT | Mod: S$GLB,,, | Performed by: OTOLARYNGOLOGY

## 2022-05-10 PROCEDURE — 3079F PR MOST RECENT DIASTOLIC BLOOD PRESSURE 80-89 MM HG: ICD-10-PCS | Mod: CPTII,S$GLB,, | Performed by: OTOLARYNGOLOGY

## 2022-05-10 NOTE — PROGRESS NOTES
Mr. Gilliam     Vitals:    05/10/22 1414   BP: 119/83   Pulse: 94   Temp: 98 °F (36.7 °C)       Chief Complaint:  Sinusitis and Sinus Problem       HPI:   is a 26-year-old white male who presents referred by  for postnasal drip and acid reflux.  He states he has had reflux issues since he was in high school.  He is on daily omeprazole at this time.  He has been given dietary restrictions but is somewhat hesitant to follow these at this time.  He also complains of recurrent strep tonsillitis 2-4 times annually treated with antibiotics.  He does describe a globus sensation.    SNOT22- 9 NOSE- 0    Review of Systems:  Constitutional:   weight loss or weight gain: Negative  Allergy/Immunologic:   Negative  Nasal Congestion/Obstruction:   Negative  Nosebleeds:   Negative  Sinus infections:   Negative  Headache/Facial Pain:   Negative  Snoring/ROMINA:   Negative  Throat: Infections/Pain:   Negative  Hoarseness/Speech Disturbance:   Negative  Trauma Hx:  Negative    Cardiovascular:  M/I Angina: Negative  Hypertension:  He does report a history of hypertension.  Endocrine:    DM/Steroids: Negative  GI:   Dysphagia/Reflux:  Positive as above.  :   GYN Pregnancy: Negative  Renal:   Dialysis: Negative  Lymphatic:   Neck Mass/Lymphadenopathy: Negative  Muscoloskeletal:   Negative  Hematologic:   Bleeding Disorders/Anemia: Negative  Neurologic:    Cranial/Neuralgia: Negative  Pulmonary:   Asthma/SOB/Cough: Negative  Skin Disorders: Negative    Past Medical/Surgical/Family/Social History:    ENT Surgery: Negative  Occupational Exposure: Negative   Problems: Negative  Cancer: Negative    Past Family History:   Family history of Cancer: Negative    Past Social History:   Tobacco: Nonsmoker   Alcohol:  Regular Social Drinker      Allergies and medications: Reviewed per med card.    Physical Examination:  Ears:   External auditory canals:  Clear   Hearing: Grossly intact   Tympanic Membranes:  Clear  Nose:   External: Normal   Intranasal:  Inferiorly based septal spur on the right with mid septal spur on the left, 1 to 2+ turbinates, nasal airway clear at this time.  Mouth:   Intraorally: Lips, teeth, and gums: Normal   Oropharynx: Normal   Mucosa: Normal   Tongue: Normal  Throat:      Palate: Normal palate with elevation   Tonsils:  3+ tonsil on the right which appears irregularly shaped and 2+ tonsil imbedded on the left.   Posterior Pharynx: Normal  Fiberoptic exam:  Exam with flexible fiberoptic scope number [id number] reveals no intranasal masses, polyps, lesions or purulence.  Minimal adenoid pad.  His epiglottis and piriform sinuses as well as base of tongue are normal without lesions or masses.  True vocal cords are normal with no lesions or masses and are mobile bilaterally.  His aryepiglottic folds do not appear erythematous or edematous at this time.  Head/Face:     Inspection: Normal and atraumatic   Palpation/Percussion: Non tender   Facial strength: Normal and symmetric   Salivary glands: Normal  Neck: Supple  Thyroid: No masses  Lymphatics: No nodes  Respiratory:   Effort: Normal  Eyes:   Ocular Mobility: Normal   Vision: Grossly intact  Neuro/Psych:   Cranial Nerves: Grossly Intact   Orientation: Normal   Mood/Affect: Normal      Assessment/Plan:  I have discussed my findings with him in detail as well as my recommendations for treatment.  I have given him literature on saline sinus rinses including issues with distilled water only and described how this to be used in detail.  I have encouraged him to continue with his Flonase nasal spray and I have described how this is to be administered as well.  I have encouraged him to follow-up with his GI specialist regarding his reflux issues.  With his history of chronic recurrent tonsillitis and significantly asymmetric tonsils we have discussed tonsillectomy possible adenoidectomy.  I have discussed the pros and cons risks and benefits as well as  technical aspects of this procedure with him in detail.  He will consider all of this information let us know of his decision.

## 2022-05-30 ENCOUNTER — TELEPHONE (OUTPATIENT)
Dept: OTOLARYNGOLOGY | Facility: CLINIC | Age: 27
End: 2022-05-30
Payer: COMMERCIAL

## 2022-05-30 NOTE — TELEPHONE ENCOUNTER
Called pt to see if had picked a surgery date, pt stated he had not given it any thought, asked to pt to give us a call back when he was ready

## 2023-01-20 ENCOUNTER — OFFICE VISIT (OUTPATIENT)
Dept: DERMATOLOGY | Facility: CLINIC | Age: 28
End: 2023-01-20
Payer: COMMERCIAL

## 2023-01-20 DIAGNOSIS — D22.9 MULTIPLE BENIGN NEVI: ICD-10-CM

## 2023-01-20 DIAGNOSIS — L81.4 LENTIGINES: ICD-10-CM

## 2023-01-20 DIAGNOSIS — Z12.83 SKIN CANCER SCREENING: Primary | ICD-10-CM

## 2023-01-20 PROCEDURE — 1160F RVW MEDS BY RX/DR IN RCRD: CPT | Mod: CPTII,S$GLB,, | Performed by: DERMATOLOGY

## 2023-01-20 PROCEDURE — 99203 OFFICE O/P NEW LOW 30 MIN: CPT | Mod: S$GLB,,, | Performed by: DERMATOLOGY

## 2023-01-20 PROCEDURE — 1159F MED LIST DOCD IN RCRD: CPT | Mod: CPTII,S$GLB,, | Performed by: DERMATOLOGY

## 2023-01-20 PROCEDURE — 99999 PR PBB SHADOW E&M-EST. PATIENT-LVL III: CPT | Mod: PBBFAC,,, | Performed by: DERMATOLOGY

## 2023-01-20 PROCEDURE — 99203 PR OFFICE/OUTPT VISIT, NEW, LEVL III, 30-44 MIN: ICD-10-PCS | Mod: S$GLB,,, | Performed by: DERMATOLOGY

## 2023-01-20 PROCEDURE — 1159F PR MEDICATION LIST DOCUMENTED IN MEDICAL RECORD: ICD-10-PCS | Mod: CPTII,S$GLB,, | Performed by: DERMATOLOGY

## 2023-01-20 PROCEDURE — 99999 PR PBB SHADOW E&M-EST. PATIENT-LVL III: ICD-10-PCS | Mod: PBBFAC,,, | Performed by: DERMATOLOGY

## 2023-01-20 PROCEDURE — 1160F PR REVIEW ALL MEDS BY PRESCRIBER/CLIN PHARMACIST DOCUMENTED: ICD-10-PCS | Mod: CPTII,S$GLB,, | Performed by: DERMATOLOGY

## 2023-01-20 NOTE — PATIENT INSTRUCTIONS

## 2023-01-20 NOTE — PROGRESS NOTES
Subjective:       Patient ID:  Meet Gilliam is a 27 y.o. male who presents for   Chief Complaint   Patient presents with    Skin Check     tbse     Patient is here today for a skin check.   Pt has a history of  yes sun exposure in the past.   Pt recalls several blistering sunburns in the past- yes  Pt has history of tanning bed use- no  Pt has  had moles removed in the past- no  Pt has history of melanoma in first degree relatives-  no  Pt here today for his first  tbse with no areas of concern.     Review of Systems   Skin:  Positive for activity-related sunscreen use and wears hat. Negative for daily sunscreen use and recent sunburn.   Hematologic/Lymphatic: Does not bruise/bleed easily.      Objective:    Physical Exam   Constitutional: He appears well-developed and well-nourished. No distress.   Neurological: He is alert and oriented to person, place, and time. He is not disoriented.   Psychiatric: He has a normal mood and affect.   Skin:   Areas Examined (abnormalities noted in diagram):   Scalp / Hair Palpated and Inspected  Head / Face Inspection Performed  Neck Inspection Performed  Chest / Axilla Inspection Performed  Abdomen Inspection Performed  Genitals / Buttocks / Groin Inspection Performed  Back Inspection Performed  RUE Inspected  LUE Inspection Performed  RLE Inspected  LLE Inspection Performed  Nails and Digits Inspection Performed                     Diagram Legend     Erythematous scaling macule/papule c/w actinic keratosis       Vascular papule c/w angioma      Pigmented verrucoid papule/plaque c/w seborrheic keratosis      Yellow umbilicated papule c/w sebaceous hyperplasia      Irregularly shaped tan macule c/w lentigo     1-2 mm smooth white papules consistent with Milia      Movable subcutaneous cyst with punctum c/w epidermal inclusion cyst      Subcutaneous movable cyst c/w pilar cyst      Firm pink to brown papule c/w dermatofibroma      Pedunculated fleshy papule(s) c/w skin tag(s)       Evenly pigmented macule c/w junctional nevus     Mildly variegated pigmented, slightly irregular-bordered macule c/w mildly atypical nevus      Flesh colored to evenly pigmented papule c/w intradermal nevus       Pink pearly papule/plaque c/w basal cell carcinoma      Erythematous hyperkeratotic cursted plaque c/w SCC      Surgical scar with no sign of skin cancer recurrence      Open and closed comedones      Inflammatory papules and pustules      Verrucoid papule consistent consistent with wart     Erythematous eczematous patches and plaques     Dystrophic onycholytic nail with subungual debris c/w onychomycosis     Umbilicated papule    Erythematous-base heme-crusted tan verrucoid plaque consistent with inflamed seborrheic keratosis     Erythematous Silvery Scaling Plaque c/w Psoriasis     See annotation      Assessment / Plan:        Skin cancer screening  Total body skin examination performed today including at least 12 points as noted in physical examination. No lesions suspicious for malignancy noted.  Patient instructed in importance of daily broad spectrum sunscreen use with spf at least 30. Sun avoidance and topical protection/protective clothing discussed.    Multiple benign nevi  Benign-appearing nevi present on exam today. Reassurance provided. Periodically examine moles and return to clinic if any moles change or become symptomatic (bleeding, itching, pain, etc).    Lentigines  These are benign sun spots which should be monitored for changes. Patient instructed in importance of daily broad spectrum sunscreen use with spf at least 30. Sun avoidance and topical protection/protective clothing discussed.      Follow up in about 1 year (around 1/20/2024) for skin check or sooner for any concerns.

## 2025-01-05 ENCOUNTER — OFFICE VISIT (OUTPATIENT)
Dept: URGENT CARE | Facility: CLINIC | Age: 30
End: 2025-01-05
Payer: COMMERCIAL

## 2025-01-05 VITALS
RESPIRATION RATE: 20 BRPM | DIASTOLIC BLOOD PRESSURE: 69 MMHG | OXYGEN SATURATION: 96 % | SYSTOLIC BLOOD PRESSURE: 144 MMHG | WEIGHT: 205 LBS | TEMPERATURE: 100 F | HEIGHT: 69 IN | BODY MASS INDEX: 30.36 KG/M2 | HEART RATE: 117 BPM

## 2025-01-05 DIAGNOSIS — J98.01 COUGH DUE TO BRONCHOSPASM: ICD-10-CM

## 2025-01-05 DIAGNOSIS — J18.9 PNEUMONIA OF LEFT UPPER LOBE DUE TO INFECTIOUS ORGANISM: Primary | ICD-10-CM

## 2025-01-05 DIAGNOSIS — R50.9 FEVER, UNSPECIFIED FEVER CAUSE: ICD-10-CM

## 2025-01-05 PROCEDURE — 71046 X-RAY EXAM CHEST 2 VIEWS: CPT | Mod: S$GLB,,, | Performed by: RADIOLOGY

## 2025-01-05 RX ORDER — PREDNISONE 20 MG/1
20 TABLET ORAL
COMMUNITY
Start: 2024-07-17

## 2025-01-05 RX ORDER — AZITHROMYCIN 250 MG/1
TABLET, FILM COATED ORAL
Qty: 6 TABLET | Refills: 0 | Status: SHIPPED | OUTPATIENT
Start: 2025-01-05

## 2025-01-05 RX ORDER — AMOXICILLIN AND CLAVULANATE POTASSIUM 875; 125 MG/1; MG/1
1 TABLET, FILM COATED ORAL EVERY 12 HOURS
Qty: 14 TABLET | Refills: 0 | Status: SHIPPED | OUTPATIENT
Start: 2025-01-05 | End: 2025-01-12

## 2025-01-05 RX ORDER — AMLODIPINE BESYLATE 5 MG/1
5 TABLET ORAL
COMMUNITY

## 2025-01-05 RX ORDER — ACETAMINOPHEN 500 MG
1000 TABLET ORAL
Status: COMPLETED | OUTPATIENT
Start: 2025-01-05 | End: 2025-01-05

## 2025-01-05 RX ORDER — BUPROPION HYDROCHLORIDE 150 MG/1
TABLET, EXTENDED RELEASE ORAL
COMMUNITY

## 2025-01-05 RX ORDER — BUPROPION HYDROCHLORIDE 150 MG/1
150 TABLET ORAL EVERY MORNING
COMMUNITY

## 2025-01-05 RX ORDER — ALBUTEROL SULFATE 90 UG/1
2 INHALANT RESPIRATORY (INHALATION) EVERY 6 HOURS PRN
Qty: 18 G | Refills: 0 | Status: SHIPPED | OUTPATIENT
Start: 2025-01-05 | End: 2026-01-05

## 2025-01-05 RX ORDER — BENZONATATE 100 MG/1
100 CAPSULE ORAL 3 TIMES DAILY
COMMUNITY
Start: 2025-01-02

## 2025-01-05 RX ORDER — CLINDAMYCIN PHOSPHATE 10 UG/ML
LOTION TOPICAL 2 TIMES DAILY
COMMUNITY
Start: 2024-07-10

## 2025-01-05 RX ORDER — ALBUTEROL SULFATE 0.83 MG/ML
2.5 SOLUTION RESPIRATORY (INHALATION)
Status: COMPLETED | OUTPATIENT
Start: 2025-01-05 | End: 2025-01-05

## 2025-01-05 RX ADMIN — Medication 1000 MG: at 12:01

## 2025-01-05 RX ADMIN — ALBUTEROL SULFATE 2.5 MG: 0.83 SOLUTION RESPIRATORY (INHALATION) at 12:01

## 2025-01-05 NOTE — PATIENT INSTRUCTIONS
Start augmentin & azithromycin antibiotic and take as prescribed. Take full dose or symptoms will not get better. Take with food and water to decrease GI upset. Try a probiotic supplement or eat daily yogurt to maintain good gut and vaginal raymond while on an antibiotic. Do not drink alcohol while taking an antibiotic.       Use albuterol inhaler for chest tightness/shortness of breath/wheezing/coughing fits as directed. I included information in your discharge paperwork about this medication for you to review. Check glucose at home and eat a well balanced diet to avoid elevated sugar levels.     continue tessalon perles as directed during the day for your cough. It will help numb the back of your throat so you do not have the urge to cough.      Try a decongestant and corticosteroid nasal spray like flonase for the next few days for sinus relief. Initial: 2 sprays in each nostril once daily for 1 week. Reduce to 1 spray in each nostril once per day. Stop taking if you develop a nose bleed. Nasal saline spray can be used together with flonase to help moisten nostrils. An antihistamine like zyrtec, claritin, allegra can also be helpful for sinus relief and will help dry nasal passages.     Regular (Guaifenesin) Mucinex 1200 mg twice per day for 10 days can help thin secretions for better clearance. Drink plenty of fluids with this.     Honey is a natural cough suppressant.     -If you do NOT have high blood pressure, you may use a decongestant form (D)  of this medication (ie. Claritin- D, zyrtec-D, allegra-D, Mucinex-D) or if you do not take the D form, you can take sudafed (pseudoephedrine) over the counter, which is a powerful decongestant. Do NOT take two decongestant (D) medications at the same time (such as mucinex-D and claritin-D or plain sudafed and claritin D). Dextromethorphan (DM) is a cough suppressant over the counter (ie. mucinex DM, robitussin, delsym; dayquil/nyquil has DM as well.) Try Afrin for  nasal congestion at bedtime. Only use for 3 days as this can cause a rebound of congestion. Try cepacol for sore throat.     Please only use over the counter cough and cold medications (decongestants) for 3-5 days at a time to avoid rebound congestion.     Warm tea/warm liquids will help soothe the back of your throat. Warm water salt gurgles can also be helpful. A dry throat will cause pain. Make sure to stay hydrated. Water and pedilyte are the best to drink. Neti pot irrigation, humidifier in your room, avoiding fans, warm compresses to face, eating/drinking hot soups, hot shower before bedtime can help.     The recommended daily fluid intake for men is 3.7 liters (seven 16 oz bottles).    Alternate Tylenol and ibuprofen every 4 hours as needed for fever and body aches.  Please take NSAIDs with a full glass of water and food to avoid GI upset.      Getting plenty of rest can aid in a faster recovery of illnesses.     Please follow-up with your primary care provider or return to the clinic if not better/worsening symptoms in 1 week.     Report to the ER if you have chest pain, shortness of breath, palpitations.

## 2025-01-05 NOTE — PROGRESS NOTES
"      Patient ID: Meet Gilliam is a 29 y.o. male.    Vitals:  height is 5' 9.02" (1.753 m) and weight is 93 kg (205 lb). His oral temperature is 99.9 °F (37.7 °C). His blood pressure is 144/69 (abnormal) and his pulse is 117 (abnormal). His respiration is 20 and oxygen saturation is 96%.     Chief Complaint: Fever (Low fever started Monday night, turned into body aches, headache, & bad cough over week. Fever at 102.5 since Wednesday, Ive been taking advil/tylenol to break it.Went to urgent care on Thursday & tested neg for flu,rsv,covid but only getting worse - Entered by patient)    Pt presents today w/ 102.5 fever at home accompanied w/ productive cough (dark yellow sputum) ; onset approx 5x days ago. Pt c/o headache , body aches, chest discomfort after coughing  , sensation of sob  and fatigue. Pt denies emesis.  Pt reports he received the influenza vaccine prior to onset of sx and was  seen [facility] and rx tessalon ; no relief. Pt self medicated with tylenol and advil ;mild relief.     Fever   This is a new problem. The current episode started 4-13 days. The problem occurs constantly. The problem has been unchanged. The maximum temperature noted was 102 to 102.9 F. The temperature was taken using an oral thermometer. Associated symptoms include chest pain, coughing, headaches, muscle aches, sleepiness and wheezing. Pertinent negatives include no abdominal pain, congestion, diarrhea, ear pain, nausea, not playful when afebrile, rash, sore throat, urinary pain or vomiting. Treatments tried: tessalon . advil and tylenol. The treatment provided mild relief.       Constitution: Positive for fatigue and fever.   HENT:  Positive for postnasal drip. Negative for ear pain, congestion and sore throat.    Cardiovascular:  Positive for chest pain.   Respiratory:  Positive for cough, shortness of breath and wheezing.    Gastrointestinal:  Negative for abdominal pain, nausea, vomiting and diarrhea.   Skin:  " Negative for rash.   Neurological:  Positive for headaches.      Objective:     Physical Exam   Constitutional: He is oriented to person, place, and time. He appears well-developed. He is cooperative.  Non-toxic appearance. He does not appear ill. No distress.   HENT:   Head: Normocephalic and atraumatic.   Ears:   Right Ear: Hearing, tympanic membrane, external ear and ear canal normal.   Left Ear: Hearing, tympanic membrane, external ear and ear canal normal.   Nose: Nose normal. No mucosal edema, rhinorrhea, nasal deformity or congestion. No epistaxis. Right sinus exhibits no maxillary sinus tenderness and no frontal sinus tenderness. Left sinus exhibits no maxillary sinus tenderness and no frontal sinus tenderness.   Mouth/Throat: Uvula is midline, oropharynx is clear and moist and mucous membranes are normal. No trismus in the jaw. Normal dentition. No uvula swelling. No oropharyngeal exudate, posterior oropharyngeal edema or posterior oropharyngeal erythema.   Eyes: Conjunctivae and lids are normal. Pupils are equal, round, and reactive to light. No scleral icterus.   Neck: Trachea normal and phonation normal. Neck supple. No edema present. No erythema present. No neck rigidity present.   Cardiovascular: Normal rate, regular rhythm, normal heart sounds and normal pulses.   Pulmonary/Chest: Effort normal. No stridor. No respiratory distress. He has no decreased breath sounds. He has no wheezes. He has rhonchi. He has no rales.   Abdominal: Normal appearance.   Musculoskeletal: Normal range of motion.         General: No deformity. Normal range of motion.   Lymphadenopathy:     He has no cervical adenopathy.   Neurological: He is alert and oriented to person, place, and time. He exhibits normal muscle tone. Coordination normal.   Skin: Skin is warm, dry, intact, not diaphoretic and not pale.   Psychiatric: His speech is normal and behavior is normal. Judgment and thought content normal.   Nursing note and vitals  reviewed.      Assessment:     1. Pneumonia of left upper lobe due to infectious organism    2. Cough due to bronchospasm    3. Fever, unspecified fever cause        Plan:     EXAMINATION:  XR CHEST PA AND LATERAL     CLINICAL HISTORY:  Cough, unspecified     TECHNIQUE:  PA and lateral views of the chest were performed.     COMPARISON:  None     FINDINGS:  Airspace opacity in the left upper lobe suggestive for a pneumonia.  The right lung is clear.  Heart size is normal.  Skeletal structures are intact.     Impression:     Left upper lobe pneumonia.        Electronically signed by:Trinity Aldrich MD  Date:                                            01/05/2025  Time:                                           13:05      Pneumonia of left upper lobe due to infectious organism  -     amoxicillin-clavulanate 875-125mg (AUGMENTIN) 875-125 mg per tablet; Take 1 tablet by mouth every 12 (twelve) hours. for 7 days  Dispense: 14 tablet; Refill: 0  -     azithromycin (Z-GIO) 250 MG tablet; Take 2 tablets by mouth on day 1; Take 1 tablet by mouth on days 2-5  Dispense: 6 tablet; Refill: 0    Cough due to bronchospasm  -     albuterol nebulizer solution 2.5 mg  -     XR CHEST PA AND LATERAL; Future; Expected date: 01/05/2025  -     albuterol (PROVENTIL HFA) 90 mcg/actuation inhaler; Inhale 2 puffs into the lungs every 6 (six) hours as needed for Wheezing or Shortness of Breath. Rescue  Dispense: 18 g; Refill: 0    Fever, unspecified fever cause  -     acetaminophen tablet 1,000 mg      HR initially 130 bpm. Pt reports not ingesting fluids or food PTA. Took daily meds. No decongestants. Low grade temp at 99.9. no anti-pyretics PTA. C/o palpitations before and after duo neb. Brochospasms improved after neb and reports breathing feels better. Maintaining sat at 96%. Gave tylenol 1g with improvement of palpitations and HR at 117 bpm . +abx for pneumonia, albuterol inhaler, continue benzonatate. Increase water intake. Home remedies.  DASH diet. F/u with PCP in 1 week. Strict ED precautions.     30 minutes spent on patient's encounter. This includes face to face time and non-face to face time preparing to see the patient (eg, review of tests), obtaining and/or reviewing separately obtained history, documenting clinical information in the electronic or other health record, independently interpreting results and communicating results to the patient/family/caregiver, or care coordinator.        Discussed results/diagnosis/plan with patient in clinic. Strict precautions given to patient to monitor for worsening signs and symptoms. Advised to follow up with PCP or specialist.  Explained side effects of medications prescribed with patient and informed him/her to discontinue use if he/she has any side effects and to inform UC or PCP if this occurs. All questions answered. Strict ED verses clinic return precautions stressed and given in depth. Advised if symptoms worsens of fail to improve he/she should go to the Emergency Room. Discharge and follow-up instructions given verbally/printed with the patient who expressed understanding and willingness to comply with my recommendations. Patient voiced understanding and in agreement with current treatment plan. Patient exits the exam room in no acute distress. Conversant and engaged during discharge discussion, verbalized understanding.

## 2025-01-06 ENCOUNTER — TELEPHONE (OUTPATIENT)
Dept: URGENT CARE | Facility: CLINIC | Age: 30
End: 2025-01-06
Payer: COMMERCIAL

## 2025-01-06 NOTE — TELEPHONE ENCOUNTER
Pt called stating he has thrush and sore in back area of mouth asking for provider recommendation. Pt was advised for severe sx to present to ER. Pt was advised that if not worsening or worrisome sx to return to clinic for follow up assessment. Dr. Soto verbalized potential concern for HIV and opportunistic infections.   AL

## 2025-01-06 NOTE — TELEPHONE ENCOUNTER
Patient called and told MA he is having sores in his mouth thrush like.  He wonders that was medication induced.  He is not prescribed any corticosteroids inhaled or oral at the time of visit yesterday for pneumonia.  It appears that he has had antibiotics and albuterol inhaler.  Advise ER for any significant worsening or worrisome symptoms.  Advise follow up for pneumonia within the next 24 hours and we can evaluate the mouth sores at that time.  Information was relayed to patient via MA over telephone

## 2025-01-26 ENCOUNTER — OFFICE VISIT (OUTPATIENT)
Dept: URGENT CARE | Facility: CLINIC | Age: 30
End: 2025-01-26
Payer: COMMERCIAL

## 2025-01-26 VITALS
HEART RATE: 108 BPM | SYSTOLIC BLOOD PRESSURE: 123 MMHG | OXYGEN SATURATION: 98 % | TEMPERATURE: 99 F | RESPIRATION RATE: 20 BRPM | DIASTOLIC BLOOD PRESSURE: 84 MMHG | HEIGHT: 69 IN | WEIGHT: 210 LBS | BODY MASS INDEX: 31.1 KG/M2

## 2025-01-26 DIAGNOSIS — S83.92XA SPRAIN OF LEFT KNEE, UNSPECIFIED LIGAMENT, INITIAL ENCOUNTER: Primary | ICD-10-CM
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
## 2025-01-26 PROCEDURE — 99214 OFFICE O/P EST MOD 30 MIN: CPT | Mod: S$GLB,,, | Performed by: FAMILY MEDICINE

## 2025-01-26 PROCEDURE — 73564 X-RAY EXAM KNEE 4 OR MORE: CPT | Mod: LT,S$GLB,, | Performed by: RADIOLOGY

## 2025-01-26 RX ORDER — IBUPROFEN 600 MG/1
600 TABLET ORAL EVERY 8 HOURS PRN
Qty: 30 TABLET | Refills: 0 | Status: SHIPPED | OUTPATIENT
Start: 2025-01-26 | End: 2025-02-05

## 2025-01-26 NOTE — PROGRESS NOTES
"Subjective:      Patient ID: Meet Gilliam is a 29 y.o. male.    Vitals:  height is 5' 9" (1.753 m) and weight is 95.3 kg (210 lb). His oral temperature is 99 °F (37.2 °C). His blood pressure is 123/84 and his pulse is 108. His respiration is 20 and oxygen saturation is 98%.     Chief Complaint: Injury (Knee injury - Entered by patient)    29 year old male c/o injury. Pt states while performing he fell on his L knee last night. Pt placed ice on the knee  and took advil last night and today. Pt states having pain while bending, twisting and weight-bearing/walking Last intake of advil 9am.  No numbness tingling or weakness in the extremity previous injury to that knee.    Injury  This is a new problem. The current episode started yesterday. The problem occurs intermittently. The problem has been gradually worsening. Pertinent negatives include no headaches, numbness or weakness. The symptoms are aggravated by twisting, walking and bending. He has tried ice (advil) for the symptoms.     Neurological:  Negative for headaches and numbness.      Objective:     Physical Exam  Constitutional: Pt oriented to person, place, and time.  Non-toxic appearance.   Patient does not appear ill. No distress. normal  HENT: No icterus or facial swelling appreciated  Head: Normocephalic and atraumatic.   Nose: No congestion.   Pulmonary/Chest: Effort normal. No stridor. No respiratory distress.   Abdominal: Normal appearance. Abdomen exhibits no distension.   Musculoskeletal:         General: No swelling.   Neurological: no focal deficit. Patient is alert and oriented to person, place, and time.   Skin: Skin is not diaphoretic and not pale. no jaundice  Psychiatric: Patients behavior is normal. Mood, judgment and thought content normal.       X-Ray Knee Complete 4 or More Views Left  Order: [id number]  Visible to patient: Yes (not seen)       Dx: Fall, initial encounter    0 Result Notes  Details    Reading Physician Reading Date Result " Priority   Ash Resendez MD  [phone] 1/26/2025 STAT     Narrative & Impression  EXAMINATION:  XR KNEE COMP 4 OR MORE VIEWS LEFT     CLINICAL HISTORY:  Unspecified fall, initial encounter     TECHNIQUE:  AP, Lateral, Sunrise and Tunnel views of the left knee were preformed     COMPARISON:  None     FINDINGS:  Bones are well mineralized for age. Overall alignment is within normal limits. No displaced fracture, dislocation or destructive osseous process.  Suspected nonspecific suprapatellar joint effusion.  Joint spaces appear relatively maintained. No subcutaneous emphysema or radiopaque foreign body.     Impression:     Suspected nonspecific suprapatellar joint effusion, without acute displaced fracture-dislocation identified.        Electronically signed by:Ash Resendez MD  Date:                                            01/26/2025  Time:                                           13:17     Assessment:     1. Sprain of left knee, unspecified ligament, initial encounter    2. Fall, initial encounter        Plan:       Sprain of left knee, unspecified ligament, initial encounter  RICE therapy  -     ibuprofen (ADVIL,MOTRIN) 600 MG tablet; Take 1 tablet (600 mg total) by mouth every 8 (eight) hours as needed.  Dispense: 30 tablet; Refill: 0  -     BANDAGE ELASTIC 4IN ACE NS  -     CRUTCHES FOR HOME USE  -     Ambulatory referral/consult to Orthopedics    Fall, initial encounter  -     X-Ray Knee Complete 4 or More Views Left; Future; Expected date: 01/26/2025         Positioning (Leave Blank If You Do Not Want): The patient was placed in a comfortable position exposing the surgical site.

## 2025-01-26 NOTE — PATIENT INSTRUCTIONS
Rest and ice and elevate the affected leg as much as you can in the next 24-48 hours.      Can use anti-inflammatory ibuprofen as needed for pain and also add acetaminophen/Tylenol for extra pain control in between doses of ibuprofen    Crutch walk until weight-bearing is more comfortable/tolerable     Follow up with the orthopedic specialist in the next 7-10 days or sooner    You can call our  line at [phone] and they can assist you in making this specialist appointment    Details    Reading Physician Reading Date Result Priority   Ash Resendez MD  [phone] 1/26/2025 STAT     Narrative & Impression  EXAMINATION:  XR KNEE COMP 4 OR MORE VIEWS LEFT     CLINICAL HISTORY:  Unspecified fall, initial encounter     TECHNIQUE:  AP, Lateral, Sunrise and Tunnel views of the left knee were preformed     COMPARISON:  None     FINDINGS:  Bones are well mineralized for age. Overall alignment is within normal limits. No displaced fracture, dislocation or destructive osseous process.  Suspected nonspecific suprapatellar joint effusion.  Joint spaces appear relatively maintained. No subcutaneous emphysema or radiopaque foreign body.     Impression:     Suspected nonspecific suprapatellar joint effusion, without acute displaced fracture-dislocation identified.        Electronically signed by:Ash Resendez MD  Date:                                            01/26/2025  Time:                                           13:17        Exam Ended: 01/26/25 13:12 CST Last Resulted: 01/26/25 13:17 CST

## 2025-01-28 ENCOUNTER — PATIENT MESSAGE (OUTPATIENT)
Dept: SPORTS MEDICINE | Facility: CLINIC | Age: 30
End: 2025-01-28

## 2025-01-28 ENCOUNTER — OFFICE VISIT (OUTPATIENT)
Dept: SPORTS MEDICINE | Facility: CLINIC | Age: 30
End: 2025-01-28
Payer: COMMERCIAL

## 2025-01-28 VITALS
HEART RATE: 98 BPM | HEIGHT: 69 IN | BODY MASS INDEX: 31.71 KG/M2 | DIASTOLIC BLOOD PRESSURE: 88 MMHG | SYSTOLIC BLOOD PRESSURE: 133 MMHG | WEIGHT: 214.06 LBS

## 2025-01-28 DIAGNOSIS — M23.92 ACUTE INTERNAL DERANGEMENT OF LEFT KNEE: ICD-10-CM

## 2025-01-28 DIAGNOSIS — M25.462 EFFUSION OF LEFT KNEE: Primary | ICD-10-CM

## 2025-01-28 PROCEDURE — 1159F MED LIST DOCD IN RCRD: CPT | Mod: CPTII,S$GLB,, | Performed by: PHYSICIAN ASSISTANT

## 2025-01-28 PROCEDURE — 3008F BODY MASS INDEX DOCD: CPT | Mod: CPTII,S$GLB,, | Performed by: PHYSICIAN ASSISTANT

## 2025-01-28 PROCEDURE — 3079F DIAST BP 80-89 MM HG: CPT | Mod: CPTII,S$GLB,, | Performed by: PHYSICIAN ASSISTANT

## 2025-01-28 PROCEDURE — 99204 OFFICE O/P NEW MOD 45 MIN: CPT | Mod: S$GLB,,, | Performed by: PHYSICIAN ASSISTANT

## 2025-01-28 PROCEDURE — 3075F SYST BP GE 130 - 139MM HG: CPT | Mod: CPTII,S$GLB,, | Performed by: PHYSICIAN ASSISTANT

## 2025-01-28 PROCEDURE — 99999 PR PBB SHADOW E&M-EST. PATIENT-LVL IV: CPT | Mod: PBBFAC,,, | Performed by: PHYSICIAN ASSISTANT

## 2025-01-28 PROCEDURE — 1160F RVW MEDS BY RX/DR IN RCRD: CPT | Mod: CPTII,S$GLB,, | Performed by: PHYSICIAN ASSISTANT

## 2025-01-28 RX ORDER — DIAZEPAM 2 MG/1
2 TABLET ORAL EVERY 6 HOURS PRN
Qty: 3 TABLET | Refills: 0 | Status: SHIPPED | OUTPATIENT
Start: 2025-01-28 | End: 2025-02-27

## 2025-01-28 NOTE — PROGRESS NOTES
CC: Left knee pain    Meet presents to the clinic for evaluation of a left knee injury that occurred during a drag performance on Saturday night. He fell backwards when his platform boots caught on his dress while bending to the ground during the show. Initially, he did not notice any pain due to adrenaline and finished the performance. About 20 minutes later, he began experiencing discomfort. When he attempted to squat down while packing up, his knee felt weak and almost gave out, prompting him to stand up immediately.    He reports tightness and weakness in his right knee. He has been avoiding bending it past a slight angle and has not attempted squatting or twisting movements. He experiences pain when performing certain motions. He is able to bear weight on the affected leg but walks with a limp, keeping the knee slightly bent as he feels it might give out if fully extended.    Meet has been elevating and icing his knee since Sunday. He visited urgent care on Sunday, where they wrapped his knee in a bandage, provided crutches, and [facility], which he did. Taking ibuprofen for pain.    Pain Score:   4    REVIEW OF SYSTEMS:   Constitution: Negative. Negative for chills, fever and night sweats.    Hematologic/Lymphatic: Negative for bleeding problem. Does not bruise/bleed easily.   Skin: Negative for dry skin, itching and rash.   Musculoskeletal: Negative for falls. Positive for left knee pain and muscle weakness.     All other review of symptoms were reviewed and found to be noncontributory.     PAST MEDICAL HISTORY:   Past Medical History:   Diagnosis Date    Allergy        PAST SURGICAL HISTORY:   History reviewed. No pertinent surgical history.    FAMILY HISTORY:   Family History   Problem Relation Name Age of Onset    Allergies Mother      Allergies Father      Allergies Sister      Asthma Neg Hx         SOCIAL HISTORY:   Social History     Socioeconomic  History    Marital status: Single   Tobacco Use    Smoking status: Never    Smokeless tobacco: Never   Substance and Sexual Activity    Alcohol use: Yes     Comment: social      Social Drivers of Health     Financial Resource Strain: Low Risk  (1/26/2025)    Overall Financial Resource Strain (CARDIA)     Difficulty of Paying Living Expenses: Not very hard   Food Insecurity: No Food Insecurity (1/26/2025)    Hunger Vital Sign     Worried About Running Out of Food in the Last Year: Never true     Ran Out of Food in the Last Year: Never true   Physical Activity: Insufficiently Active (1/26/2025)    Exercise Vital Sign     Days of Exercise per Week: 1 day     Minutes of Exercise per Session: 30 min   Stress: Stress Concern Present (1/26/2025)    [employer] - Occupational Stress Questionnaire     Feeling of Stress : Rather much   Housing Stability: Unknown (1/26/2025)    Housing Stability Vital Sign     Unable to Pay for Housing in the Last Year: No       MEDICATIONS:     Current Outpatient Medications:     albuterol (PROVENTIL HFA) 90 mcg/actuation inhaler, Inhale 2 puffs into the lungs every 6 (six) hours as needed for Wheezing or Shortness of Breath. Rescue, Disp: 18 g, Rfl: 0    amLODIPine (NORVASC) 2.5 MG tablet, Take 2.5 mg by mouth once daily., Disp: , Rfl:     amLODIPine (NORVASC) 5 MG tablet, Take 5 mg by mouth., Disp: , Rfl:     azithromycin (Z-GIO) 250 MG tablet, Take 2 tablets by mouth on day 1; Take 1 tablet by mouth on days 2-5, Disp: 6 tablet, Rfl: 0    benzonatate (TESSALON) 100 MG capsule, Take 100 mg by mouth 3 (three) times daily., Disp: , Rfl:     buPROPion (WELLBUTRIN SR) 150 MG TBSR 12 hr tablet, , Disp: , Rfl:     buPROPion (WELLBUTRIN XL) 150 MG TB24 tablet, Take 150 mg by mouth every morning., Disp: , Rfl:     clindamycin (CLEOCIN T) 1 % lotion, Apply topically 2 (two) times daily., Disp: , Rfl:     fluticasone propionate (FLONASE) 50 mcg/actuation nasal spray, 2  "sprays each nostril once to twice daily, Disp: 16 g, Rfl: 11    ibuprofen (ADVIL,MOTRIN) 600 MG tablet, Take 1 tablet (600 mg total) by mouth every 8 (eight) hours as needed., Disp: 30 tablet, Rfl: 0    omeprazole (PRILOSEC) 10 MG capsule, Take 10 mg by mouth once daily., Disp: , Rfl:     predniSONE (DELTASONE) 20 MG tablet, Take 20 mg by mouth., Disp: , Rfl:     diazePAM (VALIUM) 2 MG tablet, Take 1 tablet (2 mg total) by mouth every 6 (six) hours as needed for Anxiety., Disp: 3 tablet, Rfl: 0    ipratropium (ATROVENT) 42 mcg (0.06 %) nasal spray, 2 sprays by Nasal route 3 (three) times daily as needed for Rhinitis (runny nose or post nasal drip)., Disp: 15 mL, Rfl: 5    loratadine (CLARITIN) 10 mg tablet, Take 10 mg by mouth once daily., Disp: , Rfl:     sertraline (ZOLOFT) 100 MG tablet, Take 100 mg by mouth once daily., Disp: , Rfl:     ALLERGIES:   Review of patient's allergies indicates:  No Known Allergies     PHYSICAL EXAMINATION:  /88   Pulse 98   Ht 5' 9" (1.753 m)   Wt 97.1 kg (214 lb 1.1 oz)   BMI 31.61 kg/m²   General: Well-developed well-nourished 29 y.o. malein no acute distress   Cardiovascular: Regular rhythm by palpation of distal pulse, normal color and temperature, no concerning varicosities on symptomatic side   Lungs: No labored breathing or wheezing appreciated   Neuro: Alert and oriented ×3   Psychiatric: well oriented to person, place and time, demonstrates normal mood and affect   Skin: No rashes, lesions or ulcers, normal temperature, turgor, and texture on involved extremity    Ortho/SPM Exam  Examination of the left knee demonstrates intact extensor mechanism. 2+ effusion. Central patellar tracking. No patellar apprehension. Normal patellar mobility. Full extension. Flexion to 100. Pain with forced flexion.  No tenderness along the medial and lateral joint line. Negative Michelle's. Negative Lachman. Stable to varus/valgus stress testing at 0 and 30 deg. Negative posterior " drawer. Ligamentously stable.      IMAGING:  X-rays including standing, weight bearing AP and flexion bilateral knees, LEFT knee lateral and sunrise views ordered and images reviewed by me show:      FINDINGS:  Bones are well mineralized for age. Overall alignment is within normal limits. No displaced fracture, dislocation or destructive osseous process.  Suspected nonspecific suprapatellar joint effusion.  Joint spaces appear relatively maintained. No subcutaneous emphysema or radiopaque foreign body.    ASSESSMENT:    Left knee effusion  Internal derangement left knee  Discussion with patient about plan of care moving forward. He does not seem particularly ligamentously lax on exam but he does have a large effusion that bloody upon aspiration.   - Ordered MRI Knee to evaluate for meniscal tear or other soft tissue damage. Provided prescription for Valium to take prior to the exam.  -Continue ice, compression, ibuprofen. Will place him in a t-scope brace as well. He already has crutches.  - Follow up after MRI to discuss results and further treatment plan.    Procedures      This note was generated with the assistance of ambient listening technology. Verbal consent was obtained by the patient and accompanying visitor(s) for the recording of patient appointment to facilitate this note. I attest to having reviewed and edited the generated note for accuracy, though some syntax or spelling errors may persist. Please contact the author of this note for any clarification.

## 2025-01-31 ENCOUNTER — OFFICE VISIT (OUTPATIENT)
Dept: SPORTS MEDICINE | Facility: CLINIC | Age: 30
End: 2025-01-31
Payer: COMMERCIAL

## 2025-01-31 DIAGNOSIS — S83.282A TEAR OF LATERAL MENISCUS OF LEFT KNEE, CURRENT, UNSPECIFIED TEAR TYPE, INITIAL ENCOUNTER: ICD-10-CM

## 2025-01-31 DIAGNOSIS — S89.92XA INJURY OF POSTEROLATERAL CORNER OF LEFT KNEE, INITIAL ENCOUNTER: ICD-10-CM

## 2025-01-31 DIAGNOSIS — S83.242A TEAR OF MEDIAL MENISCUS OF LEFT KNEE, CURRENT, UNSPECIFIED TEAR TYPE, INITIAL ENCOUNTER: Primary | ICD-10-CM

## 2025-01-31 PROCEDURE — 98005 SYNCH AUDIO-VIDEO EST LOW 20: CPT | Mod: 95,,, | Performed by: PHYSICIAN ASSISTANT

## 2025-01-31 PROCEDURE — 1159F MED LIST DOCD IN RCRD: CPT | Mod: CPTII,95,, | Performed by: PHYSICIAN ASSISTANT

## 2025-01-31 PROCEDURE — 1160F RVW MEDS BY RX/DR IN RCRD: CPT | Mod: CPTII,95,, | Performed by: PHYSICIAN ASSISTANT

## 2025-01-31 NOTE — PROGRESS NOTES
Telemedicine/Virtual Visit Documentation:     The patient location is: home    The chief complaint leading to consultation is: see HPI from 1/28/2025, MRI left knee results    VISIT TYPE X   Virtual visit with synchronous audio and video    Telephone E/M service x     Total time spent with patient: see X jerald on chart below.   More than half of the time was spent counseling or coordinating care including prognosis, differential diagnosis, risks and benefits of treatment, instructions, compliance risk reductions     EST MINUTES X   73768 5    57128 10    [id number] 15    01652 25 x   [id number] 40    NEW     01461 10    39109 20    78625 30    41194 45    50448 60    PHONE      5-10    36434 11-20    93022 21-30       MRI of the left knee (done at [facility]):   FINDINGS  BONES; CARTILAGE: An acute Zero-column (Schatzker IIIA) fracture of the posterolateral subdivision of the posterior column results in a nondisplaced (13 x 17 mm) posterolateral corner fragment without gross depression, along with adjacent nondisplaced fracture of the proximal fibular head/head and corresponding marrow edema and trabecular disturbance.  Minor grade 2 chondral edema of the patellar articular cartilage and localized chondral ulceration superficial to the tibial plateau fracture are evident.  The bones and articular cartilage are otherwise intact and unremarkable for age.        MENISCI:   Medial meniscus: Longitudinal horizontal tear posterior horn and body; intact anterior horn.  Lateral meniscus: Subtle longitudinal horizontal tear posterior horn centrally slightly peripheral to the posterior root ligament (image 22, series 4; image 19, series 5); intact body and anterior horn.    CRUCIATE LIGAMENTS:   Anterior cruciate ligament (ACL): Grossly intact with interstitial edema of low grade sprain.  Posterior cruciate ligament (PCL): Intact and unremarkable for age.     COLLATERAL LIGAMENTS; CAPSULE:  Medical collateral ligament complex (MCL),  Capsule: Intact and unremarkable.  Lateral collateral ligament complex (LCL), Capsule: Partial tear/moderate sprain of the inferior meniscal fascicle and moderate edema superficial and deep to the intact iliotibial band (ITB) consistent with grade 1 sprain are evident.  The posterolateral capsule, fibular collateral ligament, and biceps femoris tendon are otherwise intact and unremarkable for age.     FLUID COLLECTIONS: Large hemorrhagic joint effusion.    EXTENSION MECHANISM: Intact and unremarkable for age.  OTHER: None significant.     Assessment/Plan:  Left knee medial and lateral mensicus tears  Discussed imaging and plan of care with the patient today. He has a medial mensicus tear as well as PLC/lateral meniscus injury. Needs follow up with Dr. Taylor to discuss POC moving forward/possible surgery. Continue T-scope. I will place a referral for PT at [facility] for prehab.

## 2025-02-03 NOTE — PROGRESS NOTES
CC: Left knee pain    29 y.o. Male who presents as a new patient to me. 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  He reports a discrete left knee injury while on stage performing in drag wearing a dress and platform boots on 1/25/25.  He fell down on his knees purposely and leaned back.  His boot heel got caught in his dress which caused him to trip when attempting to get backup.  It sounds like there was some sort of twisting injury with perhaps a varus load across the left knee in a figure 4 position.  He felt a pop in the knee with subsequent swelling.  Initially seen by mid-level provider who performed a needle aspiration.  Hemorrhagic fluid removed.  He comes into clinic today with a T scope brace in place.  No crutches.  Has some soreness in the knee but no significant pain.  No numbness or tingling.  No history of any pre-existing issues.    VAS 0-2/10    PMHx notable for n/a.   Negative for tobacco.   Negative for diabetes. Last A1C: unknown    REVIEW OF SYSTEMS:   Constitution: Negative. Negative for chills, fever and night sweats.    Hematologic/Lymphatic: Negative for bleeding problem. Does not bruise/bleed easily.   Skin: Negative for dry skin, itching and rash.   Musculoskeletal: Negative for falls. Positive for left knee pain and muscle weakness.     All other review of symptoms were reviewed and found to be noncontributory.     PAST MEDICAL HISTORY:   Past Medical History:   Diagnosis Date    Allergy      PAST SURGICAL HISTORY:   No past surgical history on file.    FAMILY HISTORY:   Family History   Problem Relation Name Age of Onset    Allergies Mother      Allergies Father      Allergies Sister      Asthma Neg Hx       SOCIAL HISTORY:   Social History     Socioeconomic History    Marital status: Single   Tobacco Use    Smoking status: Never    Smokeless tobacco: Never   Substance and Sexual Activity    Alcohol use: Yes      Comment: social      Social Drivers of Health     Financial Resource Strain: Low Risk  (1/26/2025)    Overall Financial Resource Strain (CARDIA)     Difficulty of Paying Living Expenses: Not very hard   Food Insecurity: No Food Insecurity (1/26/2025)    Hunger Vital Sign     Worried About Running Out of Food in the Last Year: Never true     Ran Out of Food in the Last Year: Never true   Physical Activity: Insufficiently Active (1/26/2025)    Exercise Vital Sign     Days of Exercise per Week: 1 day     Minutes of Exercise per Session: 30 min   Stress: Stress Concern Present (1/26/2025)    [employer] - Occupational Stress Questionnaire     Feeling of Stress : Rather much   Housing Stability: Unknown (1/26/2025)    Housing Stability Vital Sign     Unable to Pay for Housing in the Last Year: No     MEDICATIONS:     Current Outpatient Medications:     amLODIPine (NORVASC) 5 MG tablet, Take 5 mg by mouth., Disp: , Rfl:     buPROPion (WELLBUTRIN XL) 150 MG TB24 tablet, Take 150 mg by mouth every morning., Disp: , Rfl:     clindamycin (CLEOCIN T) 1 % lotion, Apply topically 2 (two) times daily., Disp: , Rfl:     fluticasone propionate (FLONASE) 50 mcg/actuation nasal spray, 2 sprays each nostril once to twice daily, Disp: 16 g, Rfl: 11    ibuprofen (ADVIL,MOTRIN) 600 MG tablet, Take 1 tablet (600 mg total) by mouth every 8 (eight) hours as needed., Disp: 30 tablet, Rfl: 0    omeprazole (PRILOSEC) 10 MG capsule, Take 10 mg by mouth once daily., Disp: , Rfl:     albuterol (PROVENTIL HFA) 90 mcg/actuation inhaler, Inhale 2 puffs into the lungs every 6 (six) hours as needed for Wheezing or Shortness of Breath. Rescue, Disp: 18 g, Rfl: 0    amLODIPine (NORVASC) 2.5 MG tablet, Take 2.5 mg by mouth once daily., Disp: , Rfl:     azithromycin (Z-GIO) 250 MG tablet, Take 2 tablets by mouth on day 1; Take 1 tablet by mouth on days 2-5, Disp: 6 tablet, Rfl: 0    benzonatate (TESSALON) 100 MG capsule, Take  "100 mg by mouth 3 (three) times daily., Disp: , Rfl:     buPROPion (WELLBUTRIN SR) 150 MG TBSR 12 hr tablet, , Disp: , Rfl:     diazePAM (VALIUM) 2 MG tablet, Take 1 tablet (2 mg total) by mouth every 6 (six) hours as needed for Anxiety., Disp: 3 tablet, Rfl: 0    ipratropium (ATROVENT) 42 mcg (0.06 %) nasal spray, 2 sprays by Nasal route 3 (three) times daily as needed for Rhinitis (runny nose or post nasal drip)., Disp: 15 mL, Rfl: 5    loratadine (CLARITIN) 10 mg tablet, Take 10 mg by mouth once daily., Disp: , Rfl:     predniSONE (DELTASONE) 20 MG tablet, Take 20 mg by mouth., Disp: , Rfl:     sertraline (ZOLOFT) 100 MG tablet, Take 100 mg by mouth once daily., Disp: , Rfl:     ALLERGIES:   Review of patient's allergies indicates:  No Known Allergies     PHYSICAL EXAMINATION:  BP (!) 132/90   Pulse 99   Ht 5' 9" (1.753 m)   Wt 98.1 kg (216 lb 2.6 oz)   BMI 31.92 kg/m²   General: Well-developed well-nourished 29 y.o. male in no acute distress   Cardiovascular: Regular rhythm by palpation of distal pulse, normal color and temperature, no concerning varicosities on symptomatic side   Lungs: No labored breathing or wheezing appreciated   Neuro: Alert and oriented ×3   Psychiatric: well oriented to person, place and time, demonstrates normal mood and affect   Skin: No rashes, lesions or ulcers, normal temperature, turgor, and texture on involved extremity    Ortho/SPM Exam  Examination of the left knee demonstrates intact extensor mechanism.  Trace effusion. Central patellar tracking. No patellar apprehension. Normal patellar mobility. Full active and passive extension. Flexion to 130 degree.  Medial and lateral joint level pain with forced flexion and extension. Tenderness over the posterolateral tibia and fibular head.  Generalized pain with Michelle's testing. Negative Lachman.  Guards with pivot shift testing.  Stable to varus stress at 0 and 30° flexion.  Mild discomfort with varus stress.  Able to bring the " knee in figure 4 position with palpable, intact LCL.  Stable to to valgus stress. Negative posterior drawer. Dial test symmetric at 30 and 90 degrees.  Neutral standing alignment.    IMAGING:  X-rays including standing, weight bearing AP and flexion bilateral knees, LEFT knee lateral and sunrise views reviewed by me show:   Bones are well mineralized for age. Overall alignment is within normal limits. No displaced fracture, dislocation or destructive osseous process. Suspected nonspecific suprapatellar joint effusion. Joint spaces appear relatively maintained. No subcutaneous emphysema or radiopaque foreign body.     Stress view XR taken today shows symmetric gapping of lateral compartment under varus stress compared to contralateral side    Hip to ankle XR shows mechanical axis passing through center of knee joint    External MRI 01/30/25 of left knee reviewed by me and discussed with patient. Study shows:   1.  Acute posterolateral corner injury includes a nondisplaced Zero- column fracture (Schatzker IIIA) of the posterolateral subdivision of the posterior tibial column, nondisplaced fracture of the proximal fibular head/neck, partial tear/moderate sprain of the lateral inferior meniscal fascicle, small longitudinal horizontal tear of the posterior horn lateral meniscus, and grade 1 sprain of the iliotibial band.  2.  Medial meniscus tear (longitudinal horizontal tear posterior horn and body).  3.  Anterior cruciate ligament low grade sprain.  4.  Large hemorrhagic joint fluid collection.     On my read:  Injury involving the posterolateral corner but no discrete tearing of significance.    ASSESSMENT:      ICD-10-CM ICD-9-CM   1. Acute medial meniscus tear of left knee, initial encounter  S83.242A 836.0   2. Acute lateral meniscus tear of left knee, initial encounter  S83.282A 836.1   3. Injury of posterolateral corner of left knee, initial encounter  S89.92XA 959.7     PLAN:     -Findings and treatment options  were discussed with the patient.  Advanced imaging shows a low-grade posterolateral corner injury without instability on stress radiographs or on clinical exam.  He does have a nondisplaced lateral tibial plateau fracture with intra-articular involvement.  MRI shows findings concerning for medial and lateral meniscus tears.  Discussed treatment options.  He will remain on crutches with toe-touch weight-bearing for at least 6 weeks post-injury.  I do have some concern regarding the potential for ongoing pain and problems with his meniscus pathology.  For that reason I have recommended diagnostic arthroscopy and meniscus treatment as indicated.  Particularly given the fact that he remains on crutches now.  I think it would be a good idea to proceed at this time with the arthroscopy.  Discussed meniscus repair versus partial meniscectomy as indicated.  The expected postop rehab and recovery course was reviewed.  Discussed the alternative of continued conservative care and treatment but there would be some risk that he has recurrent pain and problems down the road related to the meniscus pathology that could ultimately require that he go back on crutches for treatment.  He elects for surgery now.  Transitioned from a T scope brace to a long runner brace.  -Plan for Left knee diagnostic arthroscopy and medial and lateral meniscus treatment as indicated, repair versus partial meniscectomy  -All questions answered    Informed Consent:    The details of the surgical procedure were explained, including the location of probable incisions and a description of possible hardware and/or grafts to be used. Alternatives to both operative and non-operative options with associated risks and benefits were discussed. The patient understands the likely convalescence after surgery and, in particular, the expected postop rehab and recovery course. The outlined risks and potential complications of the proposed procedure include but are not  limited to: infection, poor wound healing, scarring, deformity, stiffness, swelling, continued or recurrent pain, instability, hardware or prosthetic failure if implanted, symptomatic hardware requiring removal, dislocation, weakness, neurovascular injury, numbness, chronic regional pain disorder, tissue nonhealing/irreparability/retear, subsequent contralateral limb injury or pathology, chondral injury, arthritis, fracture, blood clot formation, inability to return to previous level of activity, anesthetic or regional block complication up to death, need for additional procedure as indicated intraoperatively, and potential need for further surgery.    The patient was also informed and understands that the risks of surgery are greater for patients with a current condition or history of heart disease, obesity, clotting disorders, recurrent infections, steroid use, current or past smoking, and factors such as sedentary lifestyle and noncompliance with medications, therapy or follow-up. The degree of the increased risk is hard to estimate with any degree of precision. If applicable, smoking cessation was discussed.     All questions were answered. The patient has verbalized understanding of these issues and wishes to proceed with the surgery as discussed.    Procedures

## 2025-02-04 ENCOUNTER — HOSPITAL ENCOUNTER (OUTPATIENT)
Dept: RADIOLOGY | Facility: HOSPITAL | Age: 30
Discharge: HOME | End: 2025-02-04
Attending: ORTHOPAEDIC SURGERY
Payer: COMMERCIAL

## 2025-02-04 ENCOUNTER — OFFICE VISIT (OUTPATIENT)
Dept: SPORTS MEDICINE | Facility: CLINIC | Age: 30
End: 2025-02-04
Payer: COMMERCIAL

## 2025-02-04 VITALS
HEIGHT: 69 IN | BODY MASS INDEX: 32.02 KG/M2 | DIASTOLIC BLOOD PRESSURE: 90 MMHG | SYSTOLIC BLOOD PRESSURE: 132 MMHG | HEART RATE: 99 BPM | WEIGHT: 216.19 LBS

## 2025-02-04 DIAGNOSIS — S83.242A ACUTE MEDIAL MENISCUS TEAR OF LEFT KNEE, INITIAL ENCOUNTER: Primary | ICD-10-CM

## 2025-02-04 DIAGNOSIS — S83.282A ACUTE LATERAL MENISCUS TEAR OF LEFT KNEE, INITIAL ENCOUNTER: ICD-10-CM

## 2025-02-04 DIAGNOSIS — S89.92XA INJURY OF POSTEROLATERAL CORNER OF LEFT KNEE, INITIAL ENCOUNTER: ICD-10-CM

## 2025-02-04 DIAGNOSIS — M25.562 LEFT KNEE PAIN, UNSPECIFIED CHRONICITY: ICD-10-CM

## 2025-02-04 PROCEDURE — 73560 X-RAY EXAM OF KNEE 1 OR 2: CPT | Mod: 26,,, | Performed by: RADIOLOGY

## 2025-02-04 PROCEDURE — 77073 BONE LENGTH STUDIES: CPT | Mod: TC

## 2025-02-04 PROCEDURE — 3008F BODY MASS INDEX DOCD: CPT | Mod: CPTII,S$GLB,, | Performed by: ORTHOPAEDIC SURGERY

## 2025-02-04 PROCEDURE — 73560 X-RAY EXAM OF KNEE 1 OR 2: CPT | Mod: TC,50

## 2025-02-04 PROCEDURE — 99999 PR PBB SHADOW E&M-EST. PATIENT-LVL III: CPT | Mod: PBBFAC,,, | Performed by: ORTHOPAEDIC SURGERY

## 2025-02-04 PROCEDURE — 3080F DIAST BP >= 90 MM HG: CPT | Mod: CPTII,S$GLB,, | Performed by: ORTHOPAEDIC SURGERY

## 2025-02-04 PROCEDURE — 3075F SYST BP GE 130 - 139MM HG: CPT | Mod: CPTII,S$GLB,, | Performed by: ORTHOPAEDIC SURGERY

## 2025-02-04 PROCEDURE — 73564 X-RAY EXAM KNEE 4 OR MORE: CPT | Mod: 26,LT,, | Performed by: RADIOLOGY

## 2025-02-04 PROCEDURE — 73564 X-RAY EXAM KNEE 4 OR MORE: CPT | Mod: TC,LT

## 2025-02-04 PROCEDURE — 77073 BONE LENGTH STUDIES: CPT | Mod: 26,,, | Performed by: RADIOLOGY

## 2025-02-04 PROCEDURE — 73562 X-RAY EXAM OF KNEE 3: CPT | Mod: 26,59,RT, | Performed by: RADIOLOGY

## 2025-02-04 PROCEDURE — 99215 OFFICE O/P EST HI 40 MIN: CPT | Mod: S$GLB,,, | Performed by: ORTHOPAEDIC SURGERY

## 2025-02-05 ENCOUNTER — OFFICE VISIT (OUTPATIENT)
Dept: SPORTS MEDICINE | Facility: CLINIC | Age: 30
End: 2025-02-05
Payer: COMMERCIAL

## 2025-02-05 DIAGNOSIS — S83.282A ACUTE LATERAL MENISCUS TEAR OF LEFT KNEE, INITIAL ENCOUNTER: ICD-10-CM

## 2025-02-05 DIAGNOSIS — S83.242A ACUTE MEDIAL MENISCUS TEAR OF LEFT KNEE, INITIAL ENCOUNTER: Primary | ICD-10-CM

## 2025-02-05 PROCEDURE — 99499 UNLISTED E&M SERVICE: CPT | Mod: S$GLB,,, | Performed by: PHYSICIAN ASSISTANT

## 2025-02-05 PROCEDURE — 99999 PR PBB SHADOW E&M-EST. PATIENT-LVL III: CPT | Mod: PBBFAC,,, | Performed by: PHYSICIAN ASSISTANT

## 2025-02-05 RX ORDER — ASPIRIN 81 MG/1
81 TABLET ORAL 2 TIMES DAILY
Qty: 28 TABLET | Refills: 0 | Status: SHIPPED | OUTPATIENT
Start: 2025-02-05 | End: 2025-03-07

## 2025-02-05 RX ORDER — ONDANSETRON 4 MG/1
4 TABLET, ORALLY DISINTEGRATING ORAL EVERY 8 HOURS PRN
Qty: 30 TABLET | Refills: 0 | Status: SHIPPED | OUTPATIENT
Start: 2025-02-05

## 2025-02-05 RX ORDER — SODIUM CHLORIDE 9 MG/ML
INJECTION, SOLUTION INTRAVENOUS CONTINUOUS
Status: CANCELLED | OUTPATIENT
Start: 2025-02-05

## 2025-02-05 RX ORDER — OXYCODONE HYDROCHLORIDE 5 MG/1
5-10 TABLET ORAL
Qty: 28 TABLET | Refills: 0 | Status: SHIPPED | OUTPATIENT
Start: 2025-02-05

## 2025-02-05 NOTE — H&P
Meet Gilliam  is here for a completion of his perioperative paperwork. he  Is scheduled to undergo Left knee diagnostic arthroscopy and medial and lateral meniscus treatment as indicated, repair versus partial meniscectomy on 2/21/2025.  He is a healthy individual and does not need clearance for this procedure.     He does not need clearance.    Interested in a shower chair and RW as he lives alone. Orders placed.    Risks, indications and benefits of the surgical procedure were discussed with the patient. All questions with regard to surgery, rehab, expected return to functional activities, activities of daily living and recreational endeavors were answered to his satisfaction.    Discussed COVID-19 with the patient, they are aware of our current policies and procedures, were given the option of delaying surgery, and they elect to proceed.    Patient was informed and understands the risks of surgery are greater for patients with a current condition or hx of heart disease, obesity, clotting disorders, recurrent infections, steroid use, current or past smoking, and factors such as sedentary lifestyle and noncompliance with medications, therapy or f/u. The degree of the increased risk is hard to estimate w/ any degree of precision.    Once no other questions were asked, a brief history and physical exam was then performed.    PAST MEDICAL HISTORY:   Past Medical History:   Diagnosis Date    Allergy      PAST SURGICAL HISTORY: No past surgical history on file.  FAMILY HISTORY:   Family History   Problem Relation Name Age of Onset    Allergies Mother      Allergies Father      Allergies Sister      Asthma Neg Hx       SOCIAL HISTORY:   Social History     Socioeconomic History    Marital status: Single   Tobacco Use    Smoking status: Never    Smokeless tobacco: Never   Substance and Sexual Activity    Alcohol use: Yes     Comment: social      Social Drivers of Health     Financial Resource Strain: Low Risk  (1/26/2025)     Overall Financial Resource Strain (CARDIA)     Difficulty of Paying Living Expenses: Not very hard   Food Insecurity: No Food Insecurity (1/26/2025)    Hunger Vital Sign     Worried About Running Out of Food in the Last Year: Never true     Ran Out of Food in the Last Year: Never true   Physical Activity: Insufficiently Active (1/26/2025)    Exercise Vital Sign     Days of Exercise per Week: 1 day     Minutes of Exercise per Session: 30 min   Stress: Stress Concern Present (1/26/2025)    [employer] - Occupational Stress Questionnaire     Feeling of Stress : Rather much   Housing Stability: Unknown (1/26/2025)    Housing Stability Vital Sign     Unable to Pay for Housing in the Last Year: No       MEDICATIONS:   Current Outpatient Medications:     albuterol (PROVENTIL HFA) 90 mcg/actuation inhaler, Inhale 2 puffs into the lungs every 6 (six) hours as needed for Wheezing or Shortness of Breath. Rescue, Disp: 18 g, Rfl: 0    amLODIPine (NORVASC) 2.5 MG tablet, Take 2.5 mg by mouth once daily., Disp: , Rfl:     amLODIPine (NORVASC) 5 MG tablet, Take 5 mg by mouth., Disp: , Rfl:     azithromycin (Z-GIO) 250 MG tablet, Take 2 tablets by mouth on day 1; Take 1 tablet by mouth on days 2-5, Disp: 6 tablet, Rfl: 0    benzonatate (TESSALON) 100 MG capsule, Take 100 mg by mouth 3 (three) times daily., Disp: , Rfl:     buPROPion (WELLBUTRIN SR) 150 MG TBSR 12 hr tablet, , Disp: , Rfl:     buPROPion (WELLBUTRIN XL) 150 MG TB24 tablet, Take 150 mg by mouth every morning., Disp: , Rfl:     clindamycin (CLEOCIN T) 1 % lotion, Apply topically 2 (two) times daily., Disp: , Rfl:     diazePAM (VALIUM) 2 MG tablet, Take 1 tablet (2 mg total) by mouth every 6 (six) hours as needed for Anxiety., Disp: 3 tablet, Rfl: 0    fluticasone propionate (FLONASE) 50 mcg/actuation nasal spray, 2 sprays each nostril once to twice daily, Disp: 16 g, Rfl: 11    ibuprofen (ADVIL,MOTRIN) 600 MG tablet, Take 1 tablet (600  mg total) by mouth every 8 (eight) hours as needed., Disp: 30 tablet, Rfl: 0    ipratropium (ATROVENT) 42 mcg (0.06 %) nasal spray, 2 sprays by Nasal route 3 (three) times daily as needed for Rhinitis (runny nose or post nasal drip)., Disp: 15 mL, Rfl: 5    loratadine (CLARITIN) 10 mg tablet, Take 10 mg by mouth once daily., Disp: , Rfl:     omeprazole (PRILOSEC) 10 MG capsule, Take 10 mg by mouth once daily., Disp: , Rfl:     predniSONE (DELTASONE) 20 MG tablet, Take 20 mg by mouth., Disp: , Rfl:     sertraline (ZOLOFT) 100 MG tablet, Take 100 mg by mouth once daily., Disp: , Rfl:   ALLERGIES: Review of patient's allergies indicates:  No Known Allergies    Review of Systems   Constitution: Negative. Negative for chills, fever and night sweats.   HENT: Negative for congestion and headaches.    Eyes: Negative for blurred vision, left vision loss and right vision loss.   Cardiovascular: Negative for chest pain and syncope.   Respiratory: Negative for cough and shortness of breath.    Endocrine: Negative for polydipsia, polyphagia and polyuria.   Hematologic/Lymphatic: Negative for bleeding problem. Does not bruise/bleed easily.   Skin: Negative for dry skin, itching and rash.   Musculoskeletal: Negative for falls and muscle weakness.   Gastrointestinal: Negative for abdominal pain and bowel incontinence.   Genitourinary: Negative for bladder incontinence and nocturia.   Neurological: Negative for disturbances in coordination, loss of balance and seizures.   Psychiatric/Behavioral: Negative for depression. The patient does not have insomnia.    Allergic/Immunologic: Negative for hives and persistent infections.     PHYSICAL EXAM:  GEN: A&Ox3, WD WN NAD  HEENT: WNL  CHEST: CTAB, no W/R/R  HEART: RRR, no M/R/G   ABD: Soft, NT ND, BS x4 QUADS  MS: Refer to previous note for detailed MS exam  NEURO: CN II-XII intact       The surgical consent was then reviewed with the patient, who agreed with all the contents of the  consent form and it was signed.     PHYSICAL THERAPY:  He was also instructed regarding physical therapy and will begin on POD#1-3. He is doing physical therapy at [facility].    POST OP CARE: Instructions were reviewed including care of the wound and dressing after surgery and when he can shower.     PAIN MANAGEMENT: Meet Gilliam was instructed regarding the Polar ice unit that will be in place after surgery and his postoperative pain medications.     MEDICATION:  Roxicodone 5 mg 1-2 q 4 hours PRN for pain  Zofran 4 mg q 8 hours PRN for nausea and vomiting.  Aspirin 81mg BID x 2 weeks for DVT prophylaxis starting on the evening after surgery.      Post op meds to be delivered bedside prior to discharge. Deliver to family if patient is in surgery at 5pm.     Patient was instructed to purchase and take Colace to counter possible GI side effects of taking opiates.     DVT prophylaxis was discussed with the patient today including risk factors for developing DVTs and history of DVTs. The patient was asked if any specific recommendations were given from the doctor/s that did pre-operative surgical clearance.      If the patient was previously taking 81mg baby aspirin, they were told to not take additional baby aspirin, using the above stated aspirin and to restart the 81mg aspirin daily after completion of the aspirin dose.      Patient was also told to buy over the counter Prilosec medication and take it once daily for GI protection as long as they are taking NSAIDs or Aspirin.     The patient was told that narcotic pain medications may make them drowsy and instructions were given to not sign legal documents, drive or operate heavy machinery, cars, or equipment while under the influence of narcotic medications.     As there were no other questions to be asked, he was given my business card along with Dr. Taylor's business card if he has any questions or concerns prior to surgery or in  the postop period.

## 2025-02-05 NOTE — H&P (VIEW-ONLY)
Meet Gilliam  is here for a completion of his perioperative paperwork. he  Is scheduled to undergo Left knee diagnostic arthroscopy and medial and lateral meniscus treatment as indicated, repair versus partial meniscectomy on 2/21/2025.  He is a healthy individual and does not need clearance for this procedure.     He does not need clearance.    Interested in a shower chair and RW as he lives alone. Orders placed.    Risks, indications and benefits of the surgical procedure were discussed with the patient. All questions with regard to surgery, rehab, expected return to functional activities, activities of daily living and recreational endeavors were answered to his satisfaction.    Discussed COVID-19 with the patient, they are aware of our current policies and procedures, were given the option of delaying surgery, and they elect to proceed.    Patient was informed and understands the risks of surgery are greater for patients with a current condition or hx of heart disease, obesity, clotting disorders, recurrent infections, steroid use, current or past smoking, and factors such as sedentary lifestyle and noncompliance with medications, therapy or f/u. The degree of the increased risk is hard to estimate w/ any degree of precision.    Once no other questions were asked, a brief history and physical exam was then performed.    PAST MEDICAL HISTORY:   Past Medical History:   Diagnosis Date    Allergy      PAST SURGICAL HISTORY: No past surgical history on file.  FAMILY HISTORY:   Family History   Problem Relation Name Age of Onset    Allergies Mother      Allergies Father      Allergies Sister      Asthma Neg Hx       SOCIAL HISTORY:   Social History     Socioeconomic History    Marital status: Single   Tobacco Use    Smoking status: Never    Smokeless tobacco: Never   Substance and Sexual Activity    Alcohol use: Yes     Comment: social      Social Drivers of Health     Financial Resource Strain: Low Risk  (1/26/2025)     Overall Financial Resource Strain (CARDIA)     Difficulty of Paying Living Expenses: Not very hard   Food Insecurity: No Food Insecurity (1/26/2025)    Hunger Vital Sign     Worried About Running Out of Food in the Last Year: Never true     Ran Out of Food in the Last Year: Never true   Physical Activity: Insufficiently Active (1/26/2025)    Exercise Vital Sign     Days of Exercise per Week: 1 day     Minutes of Exercise per Session: 30 min   Stress: Stress Concern Present (1/26/2025)    [employer] - Occupational Stress Questionnaire     Feeling of Stress : Rather much   Housing Stability: Unknown (1/26/2025)    Housing Stability Vital Sign     Unable to Pay for Housing in the Last Year: No       MEDICATIONS:   Current Outpatient Medications:     albuterol (PROVENTIL HFA) 90 mcg/actuation inhaler, Inhale 2 puffs into the lungs every 6 (six) hours as needed for Wheezing or Shortness of Breath. Rescue, Disp: 18 g, Rfl: 0    amLODIPine (NORVASC) 2.5 MG tablet, Take 2.5 mg by mouth once daily., Disp: , Rfl:     amLODIPine (NORVASC) 5 MG tablet, Take 5 mg by mouth., Disp: , Rfl:     azithromycin (Z-GIO) 250 MG tablet, Take 2 tablets by mouth on day 1; Take 1 tablet by mouth on days 2-5, Disp: 6 tablet, Rfl: 0    benzonatate (TESSALON) 100 MG capsule, Take 100 mg by mouth 3 (three) times daily., Disp: , Rfl:     buPROPion (WELLBUTRIN SR) 150 MG TBSR 12 hr tablet, , Disp: , Rfl:     buPROPion (WELLBUTRIN XL) 150 MG TB24 tablet, Take 150 mg by mouth every morning., Disp: , Rfl:     clindamycin (CLEOCIN T) 1 % lotion, Apply topically 2 (two) times daily., Disp: , Rfl:     diazePAM (VALIUM) 2 MG tablet, Take 1 tablet (2 mg total) by mouth every 6 (six) hours as needed for Anxiety., Disp: 3 tablet, Rfl: 0    fluticasone propionate (FLONASE) 50 mcg/actuation nasal spray, 2 sprays each nostril once to twice daily, Disp: 16 g, Rfl: 11    ibuprofen (ADVIL,MOTRIN) 600 MG tablet, Take 1 tablet (600  mg total) by mouth every 8 (eight) hours as needed., Disp: 30 tablet, Rfl: 0    ipratropium (ATROVENT) 42 mcg (0.06 %) nasal spray, 2 sprays by Nasal route 3 (three) times daily as needed for Rhinitis (runny nose or post nasal drip)., Disp: 15 mL, Rfl: 5    loratadine (CLARITIN) 10 mg tablet, Take 10 mg by mouth once daily., Disp: , Rfl:     omeprazole (PRILOSEC) 10 MG capsule, Take 10 mg by mouth once daily., Disp: , Rfl:     predniSONE (DELTASONE) 20 MG tablet, Take 20 mg by mouth., Disp: , Rfl:     sertraline (ZOLOFT) 100 MG tablet, Take 100 mg by mouth once daily., Disp: , Rfl:   ALLERGIES: Review of patient's allergies indicates:  No Known Allergies    Review of Systems   Constitution: Negative. Negative for chills, fever and night sweats.   HENT: Negative for congestion and headaches.    Eyes: Negative for blurred vision, left vision loss and right vision loss.   Cardiovascular: Negative for chest pain and syncope.   Respiratory: Negative for cough and shortness of breath.    Endocrine: Negative for polydipsia, polyphagia and polyuria.   Hematologic/Lymphatic: Negative for bleeding problem. Does not bruise/bleed easily.   Skin: Negative for dry skin, itching and rash.   Musculoskeletal: Negative for falls and muscle weakness.   Gastrointestinal: Negative for abdominal pain and bowel incontinence.   Genitourinary: Negative for bladder incontinence and nocturia.   Neurological: Negative for disturbances in coordination, loss of balance and seizures.   Psychiatric/Behavioral: Negative for depression. The patient does not have insomnia.    Allergic/Immunologic: Negative for hives and persistent infections.     PHYSICAL EXAM:  GEN: A&Ox3, WD WN NAD  HEENT: WNL  CHEST: CTAB, no W/R/R  HEART: RRR, no M/R/G   ABD: Soft, NT ND, BS x4 QUADS  MS: Refer to previous note for detailed MS exam  NEURO: CN II-XII intact       The surgical consent was then reviewed with the patient, who agreed with all the contents of the  consent form and it was signed.     PHYSICAL THERAPY:  He was also instructed regarding physical therapy and will begin on POD#1-3. He is doing physical therapy at [facility].    POST OP CARE: Instructions were reviewed including care of the wound and dressing after surgery and when he can shower.     PAIN MANAGEMENT: Meet Gilliam was instructed regarding the Polar ice unit that will be in place after surgery and his postoperative pain medications.     MEDICATION:  Roxicodone 5 mg 1-2 q 4 hours PRN for pain  Zofran 4 mg q 8 hours PRN for nausea and vomiting.  Aspirin 81mg BID x 2 weeks for DVT prophylaxis starting on the evening after surgery.      Post op meds to be delivered bedside prior to discharge. Deliver to family if patient is in surgery at 5pm.     Patient was instructed to purchase and take Colace to counter possible GI side effects of taking opiates.     DVT prophylaxis was discussed with the patient today including risk factors for developing DVTs and history of DVTs. The patient was asked if any specific recommendations were given from the doctor/s that did pre-operative surgical clearance.      If the patient was previously taking 81mg baby aspirin, they were told to not take additional baby aspirin, using the above stated aspirin and to restart the 81mg aspirin daily after completion of the aspirin dose.      Patient was also told to buy over the counter Prilosec medication and take it once daily for GI protection as long as they are taking NSAIDs or Aspirin.     The patient was told that narcotic pain medications may make them drowsy and instructions were given to not sign legal documents, drive or operate heavy machinery, cars, or equipment while under the influence of narcotic medications.     As there were no other questions to be asked, he was given my business card along with Dr. Taylor's business card if he has any questions or concerns prior to surgery or in  the postop period.      25-Oct-2021 09:33

## 2025-02-07 ENCOUNTER — PATIENT MESSAGE (OUTPATIENT)
Dept: SPORTS MEDICINE | Facility: CLINIC | Age: 30
End: 2025-02-07
Payer: COMMERCIAL

## 2025-02-07 ENCOUNTER — TELEPHONE (OUTPATIENT)
Dept: SPORTS MEDICINE | Facility: CLINIC | Age: 30
End: 2025-02-07
Payer: COMMERCIAL

## 2025-02-07 NOTE — TELEPHONE ENCOUNTER
----- Message from Brit sent at 2/7/2025  4:30 PM CST -----  Regarding: call back  Type: Patient Call Back    Who called: Mirian Whatley     What is the request in detail: auth for knee repair approved 2/21-5/21, auth [id number]    Can the clinic reply by [facility]?no    Would the patient rather a call back or a response via [facility]? call    Best call back number:     Additional Information:

## 2025-02-11 ENCOUNTER — PATIENT MESSAGE (OUTPATIENT)
Dept: SPORTS MEDICINE | Facility: CLINIC | Age: 30
End: 2025-02-11
Payer: COMMERCIAL

## 2025-02-11 ENCOUNTER — PATIENT MESSAGE (OUTPATIENT)
Dept: PREADMISSION TESTING | Facility: HOSPITAL | Age: 30
End: 2025-02-11
Payer: COMMERCIAL

## 2025-02-11 NOTE — ANESTHESIA PAT ROS NOTE
02/11/2025  Meet Gilliam is a 29 y.o., male.      Pre-op Assessment    I have reviewed the Patient Summary Reports.       I have reviewed the Medications.     Review of Systems  Anesthesia Hx:  No previous Anesthesia   Neg history of prior surgery.             Social:  No Alcohol Use, Social Alcohol Use       Hematology/Oncology:  Hematology Normal   Oncology Normal                                   EENT/Dental:  chronic allergic rhinitis Allergies          Cardiovascular:  Exercise tolerance: good   Hypertension    Denies CAD.     Denies Dysrhythmias.   Denies Angina.     hyperlipidemia  Denies CARVALHO.    Patient not on beta blockers                          Pulmonary:  Pneumonia  Denies COPD.  Denies Asthma.   Denies Shortness of breath.   Treated for ROSIBEL Pneumonia on 1/5/2025               Renal/:  Renal/ Normal                 Hepatic/GI:  Hepatic/GI Normal     Denies GERD.    Not Taking GLP-1 Agonists            Musculoskeletal:     Acute medial meniscus tear of left knee,   Acute lateral meniscus tear of left knee,              Neurological:  Neurology Normal   Denies CVA.    Denies Headaches. Denies Seizures.                                Endocrine:  Denies Diabetes. Denies Hypothyroidism.        Obesity / BMI > 30  Psych:  Psychiatric History  depression Adjustment disorder with depressed mood              Past Medical History:   Diagnosis Date    Allergy     Hypertension     Mixed hyperlipidemia      History reviewed. No pertinent surgical history.      Anesthesia Assessment: Preoperative EQUATION    Planned Procedure: Procedure(s) (LRB):  ARTHROSCOPY,KNEE,WITH MENISCUS REPAIR (Left)  Requested Anesthesia Type:General/Regional  Surgeon: KAREN Taylor MD  Service: Orthopedics  Known or anticipated Date of Surgery:2/21/2025    Surgeon notes: reviewed    Electronic QUestionnaire Assessment  completed via nurse interview with patient.        Triage considerations:     The patient has no apparent active cardiac condition (No unstable coronary Syndrome such as severe unstable angina or recent [<1 month] myocardial infarction, decompensated CHF, severe valvular   disease or significant arrhythmia)    Previous anesthesia records:None    Last PCP note: > 1 year ago , within [facility]   Subspecialty notes: n/a    Other important co-morbidities: GERD, HLD, HTN, and Obesity      Tests already available:  No recent tests.             Instructions given. (See in Nurse's note) Preop medication instructions sent via portal message.     Optimization:  Anesthesia Preop Clinic Assessment Not Indicated    Medical Opinion Indicated: No       Sub-specialist consult indicated:  No      Plan: Consultation: medical clearance is not requested.        Navigation: Tests Scheduled: BMP                    Straight Line to surgery.               No anesthesia preop clinic visit, or consult required.                        Patient is OK to proceed with surgery at [facility].       Ht: 5'9  Wt: 98.1 kg (216 lb)  BMI: 31.92

## 2025-02-13 ENCOUNTER — PATIENT MESSAGE (OUTPATIENT)
Dept: SPORTS MEDICINE | Facility: CLINIC | Age: 30
End: 2025-02-13
Payer: COMMERCIAL

## 2025-02-13 ENCOUNTER — TELEPHONE (OUTPATIENT)
Dept: SPORTS MEDICINE | Facility: CLINIC | Age: 30
End: 2025-02-13
Payer: COMMERCIAL

## 2025-02-13 NOTE — TELEPHONE ENCOUNTER
Pt's mom is calling to see if we can send a home health rep to their home. She said she is not able to lift/assist the pt into his home after surgery. She wanted to know if there is a company that can have someone at the house to assist pt up the stairs and then have a wheelchair waiting. I told her that I have not had to utilize this before and I had to check into to it.     ----- Message from Amita sent at 2/13/2025  7:47 AM CST -----  Name of Caller: Jeb Jessie     Nature of Call: requesting advice     Best Call Back Number: [phone]     Additional Information:  Meet Gilliam mom Jessie calling regarding Patient Advice for wanting to get any service company she can contact to get assistance with getting the PT inside once he get home, after the surgery. The patient's mom is also asking if a EKG is required for clearance with this surgery, because the PT did not inform her if this was needed or done for him prior to the surgery. Please call the jeb Jessie to advise on this issue.

## 2025-02-13 NOTE — TELEPHONE ENCOUNTER
Attempted to contact pt. Left voicemail. Called to offer pt sooner surgery date of 02/19 due to a cancellation. Asked pt to return call to clinic at [phone] or respond via [email or web address].

## 2025-02-20 ENCOUNTER — PATIENT MESSAGE (OUTPATIENT)
Dept: SPORTS MEDICINE | Facility: CLINIC | Age: 30
End: 2025-02-20
Payer: COMMERCIAL

## 2025-02-20 ENCOUNTER — ANESTHESIA EVENT (OUTPATIENT)
Dept: SURGERY | Facility: HOSPITAL | Age: 30
End: 2025-02-20
Payer: COMMERCIAL

## 2025-02-20 ENCOUNTER — TELEPHONE (OUTPATIENT)
Dept: SPORTS MEDICINE | Facility: CLINIC | Age: 30
End: 2025-02-20
Payer: COMMERCIAL

## 2025-02-20 NOTE — TELEPHONE ENCOUNTER
Spoke c pt. Informed pt of 10:30 arrival time for 02/21/25 surgery at the [facility]. Reminded pt of NPO status. Pt expressed understanding & was thankful.

## 2025-02-21 ENCOUNTER — ANESTHESIA (OUTPATIENT)
Dept: SURGERY | Facility: HOSPITAL | Age: 30
End: 2025-02-21
Payer: COMMERCIAL

## 2025-02-21 ENCOUNTER — HOSPITAL ENCOUNTER (OUTPATIENT)
Facility: HOSPITAL | Age: 30
Discharge: HOME | End: 2025-02-21
Attending: ORTHOPAEDIC SURGERY | Admitting: ORTHOPAEDIC SURGERY
Payer: COMMERCIAL

## 2025-02-21 VITALS
DIASTOLIC BLOOD PRESSURE: 71 MMHG | RESPIRATION RATE: 14 BRPM | HEIGHT: 69 IN | TEMPERATURE: 98 F | SYSTOLIC BLOOD PRESSURE: 116 MMHG | OXYGEN SATURATION: 99 % | WEIGHT: 216 LBS | BODY MASS INDEX: 31.99 KG/M2 | HEART RATE: 82 BPM

## 2025-02-21 DIAGNOSIS — S83.282A ACUTE LATERAL MENISCUS TEAR OF LEFT KNEE, INITIAL ENCOUNTER: ICD-10-CM

## 2025-02-21 DIAGNOSIS — S83.242A ACUTE MEDIAL MENISCUS TEAR OF LEFT KNEE, INITIAL ENCOUNTER: ICD-10-CM

## 2025-02-21 DIAGNOSIS — Z01.818 PREOP TESTING: Primary | ICD-10-CM

## 2025-02-21 PROCEDURE — 63600175 PHARM REV CODE 636 W HCPCS: Performed by: NURSE ANESTHETIST, CERTIFIED REGISTERED

## 2025-02-21 PROCEDURE — 36000711: Performed by: ORTHOPAEDIC SURGERY

## 2025-02-21 PROCEDURE — 27201423 OPTIME MED/SURG SUP & DEVICES STERILE SUPPLY: Performed by: ORTHOPAEDIC SURGERY

## 2025-02-21 PROCEDURE — 71000033 HC RECOVERY, INTIAL HOUR: Performed by: ORTHOPAEDIC SURGERY

## 2025-02-21 PROCEDURE — 94761 N-INVAS EAR/PLS OXIMETRY MLT: CPT

## 2025-02-21 PROCEDURE — 27530 TREAT KNEE FRACTURE: CPT | Mod: 51,LT,, | Performed by: ORTHOPAEDIC SURGERY

## 2025-02-21 PROCEDURE — 64448 NJX AA&/STRD FEM NRV NFS IMG: CPT | Performed by: STUDENT IN AN ORGANIZED HEALTH CARE EDUCATION/TRAINING PROGRAM

## 2025-02-21 PROCEDURE — 63600175 PHARM REV CODE 636 W HCPCS: Performed by: STUDENT IN AN ORGANIZED HEALTH CARE EDUCATION/TRAINING PROGRAM

## 2025-02-21 PROCEDURE — 25000003 PHARM REV CODE 250: Performed by: NURSE ANESTHETIST, CERTIFIED REGISTERED

## 2025-02-21 PROCEDURE — 63600175 PHARM REV CODE 636 W HCPCS: Performed by: PHYSICIAN ASSISTANT

## 2025-02-21 PROCEDURE — 99900035 HC TECH TIME PER 15 MIN (STAT)

## 2025-02-21 PROCEDURE — 37000008 HC ANESTHESIA 1ST 15 MINUTES: Performed by: ORTHOPAEDIC SURGERY

## 2025-02-21 PROCEDURE — 37000009 HC ANESTHESIA EA ADD 15 MINS: Performed by: ORTHOPAEDIC SURGERY

## 2025-02-21 PROCEDURE — 99499 UNLISTED E&M SERVICE: CPT | Mod: ,,, | Performed by: STUDENT IN AN ORGANIZED HEALTH CARE EDUCATION/TRAINING PROGRAM

## 2025-02-21 PROCEDURE — 27200651 HC AIRWAY, LMA: Performed by: STUDENT IN AN ORGANIZED HEALTH CARE EDUCATION/TRAINING PROGRAM

## 2025-02-21 PROCEDURE — 36000710: Performed by: ORTHOPAEDIC SURGERY

## 2025-02-21 PROCEDURE — 25000003 PHARM REV CODE 250: Performed by: STUDENT IN AN ORGANIZED HEALTH CARE EDUCATION/TRAINING PROGRAM

## 2025-02-21 PROCEDURE — 71000039 HC RECOVERY, EACH ADD'L HOUR: Performed by: ORTHOPAEDIC SURGERY

## 2025-02-21 PROCEDURE — 29881 ARTHRS KNE SRG MNISECTMY M/L: CPT | Mod: LT,,, | Performed by: ORTHOPAEDIC SURGERY

## 2025-02-21 PROCEDURE — 63600175 PHARM REV CODE 636 W HCPCS: Performed by: ORTHOPAEDIC SURGERY

## 2025-02-21 PROCEDURE — 25000003 PHARM REV CODE 250: Performed by: PHYSICIAN ASSISTANT

## 2025-02-21 PROCEDURE — 25000003 PHARM REV CODE 250: Performed by: ANESTHESIOLOGY

## 2025-02-21 RX ORDER — FAMOTIDINE 10 MG/ML
INJECTION INTRAVENOUS
Status: DISCONTINUED | OUTPATIENT
Start: 2025-02-21 | End: 2025-02-21

## 2025-02-21 RX ORDER — OXYCODONE HYDROCHLORIDE 5 MG/1
5 TABLET ORAL
Status: DISCONTINUED | OUTPATIENT
Start: 2025-02-21 | End: 2025-02-21 | Stop reason: HOSPADM

## 2025-02-21 RX ORDER — CELECOXIB 200 MG/1
400 CAPSULE ORAL
Status: COMPLETED | OUTPATIENT
Start: 2025-02-21 | End: 2025-02-21

## 2025-02-21 RX ORDER — ROPIVACAINE HYDROCHLORIDE 5 MG/ML
INJECTION, SOLUTION EPIDURAL; INFILTRATION; PERINEURAL
Status: COMPLETED | OUTPATIENT
Start: 2025-02-21 | End: 2025-02-21

## 2025-02-21 RX ORDER — GLUCAGON 1 MG
1 KIT INJECTION
Status: DISCONTINUED | OUTPATIENT
Start: 2025-02-21 | End: 2025-02-21 | Stop reason: HOSPADM

## 2025-02-21 RX ORDER — FENTANYL CITRATE 50 UG/ML
25 INJECTION, SOLUTION INTRAMUSCULAR; INTRAVENOUS EVERY 5 MIN PRN
Status: COMPLETED | OUTPATIENT
Start: 2025-02-21 | End: 2025-02-21

## 2025-02-21 RX ORDER — SODIUM CHLORIDE 9 MG/ML
INJECTION, SOLUTION INTRAVENOUS CONTINUOUS
Status: DISCONTINUED | OUTPATIENT
Start: 2025-02-21 | End: 2025-02-21 | Stop reason: HOSPADM

## 2025-02-21 RX ORDER — PROPOFOL 10 MG/ML
VIAL (ML) INTRAVENOUS
Status: DISCONTINUED | OUTPATIENT
Start: 2025-02-21 | End: 2025-02-21

## 2025-02-21 RX ORDER — HYDROMORPHONE HYDROCHLORIDE 1 MG/ML
0.2 INJECTION, SOLUTION INTRAMUSCULAR; INTRAVENOUS; SUBCUTANEOUS EVERY 5 MIN PRN
Status: DISCONTINUED | OUTPATIENT
Start: 2025-02-21 | End: 2025-02-21 | Stop reason: HOSPADM

## 2025-02-21 RX ORDER — MIDAZOLAM HYDROCHLORIDE 1 MG/ML
1 INJECTION, SOLUTION INTRAMUSCULAR; INTRAVENOUS
Status: DISCONTINUED | OUTPATIENT
Start: 2025-02-21 | End: 2025-02-21 | Stop reason: HOSPADM

## 2025-02-21 RX ORDER — CEFAZOLIN 2 G/1
2 INJECTION, POWDER, FOR SOLUTION INTRAMUSCULAR; INTRAVENOUS
Status: DISCONTINUED | OUTPATIENT
Start: 2025-02-21 | End: 2025-02-21 | Stop reason: HOSPADM

## 2025-02-21 RX ORDER — ONDANSETRON HYDROCHLORIDE 2 MG/ML
4 INJECTION, SOLUTION INTRAVENOUS DAILY PRN
Status: DISCONTINUED | OUTPATIENT
Start: 2025-02-21 | End: 2025-02-21 | Stop reason: HOSPADM

## 2025-02-21 RX ORDER — DEXMEDETOMIDINE HYDROCHLORIDE 100 UG/ML
INJECTION, SOLUTION INTRAVENOUS
Status: DISCONTINUED | OUTPATIENT
Start: 2025-02-21 | End: 2025-02-21

## 2025-02-21 RX ORDER — EPINEPHRINE 1 MG/ML
INJECTION INTRAMUSCULAR; INTRAVENOUS; SUBCUTANEOUS
Status: DISCONTINUED | OUTPATIENT
Start: 2025-02-21 | End: 2025-02-21 | Stop reason: HOSPADM

## 2025-02-21 RX ORDER — KETAMINE HCL IN 0.9 % NACL 50 MG/5 ML
SYRINGE (ML) INTRAVENOUS
Status: DISCONTINUED | OUTPATIENT
Start: 2025-02-21 | End: 2025-02-21

## 2025-02-21 RX ORDER — PNV NO.95/FERROUS FUM/FOLIC AC 28MG-0.8MG
100 TABLET ORAL DAILY
COMMUNITY

## 2025-02-21 RX ORDER — ROPIVACAINE HYDROCHLORIDE 2 MG/ML
INJECTION, SOLUTION EPIDURAL; INFILTRATION; PERINEURAL CONTINUOUS
Status: DISCONTINUED | OUTPATIENT
Start: 2025-02-21 | End: 2025-02-21 | Stop reason: HOSPADM

## 2025-02-21 RX ORDER — TRANEXAMIC ACID 100 MG/ML
INJECTION, SOLUTION INTRAVENOUS
Status: DISCONTINUED | OUTPATIENT
Start: 2025-02-21 | End: 2025-02-21

## 2025-02-21 RX ORDER — METHOCARBAMOL 500 MG/1
1000 TABLET, FILM COATED ORAL ONCE
Status: COMPLETED | OUTPATIENT
Start: 2025-02-21 | End: 2025-02-21

## 2025-02-21 RX ORDER — DEXAMETHASONE SODIUM PHOSPHATE 4 MG/ML
INJECTION, SOLUTION INTRA-ARTICULAR; INTRALESIONAL; INTRAMUSCULAR; INTRAVENOUS; SOFT TISSUE
Status: DISCONTINUED | OUTPATIENT
Start: 2025-02-21 | End: 2025-02-21

## 2025-02-21 RX ORDER — ONDANSETRON HYDROCHLORIDE 2 MG/ML
INJECTION, SOLUTION INTRAVENOUS
Status: DISCONTINUED | OUTPATIENT
Start: 2025-02-21 | End: 2025-02-21

## 2025-02-21 RX ORDER — HALOPERIDOL 5 MG/ML
0.5 INJECTION INTRAMUSCULAR EVERY 10 MIN PRN
Status: DISCONTINUED | OUTPATIENT
Start: 2025-02-21 | End: 2025-02-21 | Stop reason: HOSPADM

## 2025-02-21 RX ORDER — CARBOXYMETHYLCELLULOSE SODIUM 10 MG/ML
GEL OPHTHALMIC
Status: DISCONTINUED | OUTPATIENT
Start: 2025-02-21 | End: 2025-02-21

## 2025-02-21 RX ORDER — FENTANYL CITRATE 50 UG/ML
100 INJECTION, SOLUTION INTRAMUSCULAR; INTRAVENOUS
Status: DISCONTINUED | OUTPATIENT
Start: 2025-02-21 | End: 2025-02-21 | Stop reason: HOSPADM

## 2025-02-21 RX ORDER — ACETAMINOPHEN 500 MG
1000 TABLET ORAL
Status: COMPLETED | OUTPATIENT
Start: 2025-02-21 | End: 2025-02-21

## 2025-02-21 RX ORDER — LIDOCAINE HYDROCHLORIDE 20 MG/ML
INJECTION INTRAVENOUS
Status: DISCONTINUED | OUTPATIENT
Start: 2025-02-21 | End: 2025-02-21

## 2025-02-21 RX ADMIN — Medication 25 MG: at 12:02

## 2025-02-21 RX ADMIN — DEXMEDETOMIDINE 10 MCG: 100 INJECTION, SOLUTION, CONCENTRATE INTRAVENOUS at 12:02

## 2025-02-21 RX ADMIN — MIDAZOLAM 2 MG: 1 INJECTION INTRAMUSCULAR; INTRAVENOUS at 11:02

## 2025-02-21 RX ADMIN — ROPIVACAINE HYDROCHLORIDE 7.5 ML: 5 INJECTION EPIDURAL; INFILTRATION; PERINEURAL at 11:02

## 2025-02-21 RX ADMIN — CEFAZOLIN 2 G: 2 INJECTION, POWDER, FOR SOLUTION INTRAMUSCULAR; INTRAVENOUS at 12:02

## 2025-02-21 RX ADMIN — FENTANYL CITRATE 25 MCG: 50 INJECTION, SOLUTION INTRAMUSCULAR; INTRAVENOUS at 02:02

## 2025-02-21 RX ADMIN — SODIUM CHLORIDE: 9 INJECTION, SOLUTION INTRAVENOUS at 11:02

## 2025-02-21 RX ADMIN — OXYCODONE 5 MG: 5 TABLET ORAL at 01:02

## 2025-02-21 RX ADMIN — FENTANYL CITRATE 100 MCG: 50 INJECTION INTRAMUSCULAR; INTRAVENOUS at 11:02

## 2025-02-21 RX ADMIN — ONDANSETRON 4 MG: 2 INJECTION INTRAMUSCULAR; INTRAVENOUS at 12:02

## 2025-02-21 RX ADMIN — CELECOXIB 400 MG: 200 CAPSULE ORAL at 11:02

## 2025-02-21 RX ADMIN — CARBOXYMETHYLCELLULOSE SODIUM 4 DROP: 10 GEL OPHTHALMIC at 12:02

## 2025-02-21 RX ADMIN — DEXAMETHASONE SODIUM PHOSPHATE 8 MG: 4 INJECTION, SOLUTION INTRAMUSCULAR; INTRAVENOUS at 12:02

## 2025-02-21 RX ADMIN — ACETAMINOPHEN 1000 MG: 500 TABLET ORAL at 11:02

## 2025-02-21 RX ADMIN — FENTANYL CITRATE 50 MCG: 50 INJECTION INTRAMUSCULAR; INTRAVENOUS at 01:02

## 2025-02-21 RX ADMIN — TRANEXAMIC ACID 1000 MG: 100 INJECTION INTRAVENOUS at 01:02

## 2025-02-21 RX ADMIN — Medication: at 01:02

## 2025-02-21 RX ADMIN — FENTANYL CITRATE 50 MCG: 50 INJECTION INTRAMUSCULAR; INTRAVENOUS at 12:02

## 2025-02-21 RX ADMIN — LIDOCAINE HYDROCHLORIDE 100 MG: 20 INJECTION INTRAVENOUS at 12:02

## 2025-02-21 RX ADMIN — PROPOFOL 300 MG: 10 INJECTION, EMULSION INTRAVENOUS at 12:02

## 2025-02-21 RX ADMIN — FAMOTIDINE 20 MG: 10 INJECTION, SOLUTION INTRAVENOUS at 12:02

## 2025-02-21 RX ADMIN — METHOCARBAMOL 1000 MG: 500 TABLET ORAL at 03:02

## 2025-02-21 NOTE — PLAN OF CARE
Pre op complete. Waiting on anesth consent, site jerald and update. Pt's mother at bedside; will lock belongings in locker. Pt resting comfortably with all questions addressed at this time and call light in reach. Pt has brace and crutches.

## 2025-02-21 NOTE — ANESTHESIA PREPROCEDURE EVALUATION
"                                                                                                             2025  Pre-operative evaluation for Procedure(s) (LRB):  ARTHROSCOPY,KNEE,WITH MENISCUS REPAIR (Left)    Meet Gilliam is a 29 y.o. male     Problem List[1]    Review of patient's allergies indicates:  No Known Allergies    Medications Ordered Prior to Encounter[2]    History reviewed. No pertinent surgical history.    Social History[3]      CBC: No results for input(s): "WBC", "RBC", "HGB", "HCT", "PLT", "MCV", "MCH", "MCHC" in the last 72 hours.    CMP: No results for input(s): "NA", "K", "CL", "CO2", "BUN", "CREATININE", "GLU", "MG", "PHOS", "CALCIUM", "ALBUMIN", "PROT", "ALKPHOS", "ALT", "AST", "BILITOT" in the last 72 hours.    INR  No results for input(s): "PT", "INR", "PROTIME", "APTT" in the last 72 hours.        Diagnostic Studies:      EKD Echo:  No results found for this or any previous visit.       Pre-op Assessment    I have reviewed the Patient Summary Reports.     I have reviewed the Nursing Notes. I have reviewed the NPO Status.   I have reviewed the Medications.     Review of Systems  Cardiovascular:     Hypertension                                              Physical Exam  General: Well nourished and Cooperative    Airway:  Mallampati: II   Mouth Opening: Normal  TM Distance: Normal  Tongue: Normal  Neck ROM: Normal ROM    Chest/Lungs:  Clear to auscultation, Normal Respiratory Rate    Heart:  Rate: Normal  Rhythm: Regular Rhythm  Sounds: Normal        Anesthesia Plan  Type of Anesthesia, risks & benefits discussed:    Anesthesia Type: Gen ETT, Gen Supraglottic Airway  Intra-op Monitoring Plan: Standard ASA Monitors  Post Op Pain Control Plan: multimodal analgesia and IV/PO Opioids PRN  Induction:  IV  Airway Plan: Direct and Video, Post-Induction  Informed Consent: Informed consent signed with the Patient and all parties understand the risks and agree with anesthesia plan.  " All questions answered.   ASA Score: 2    Ready For Surgery From Anesthesia Perspective.     .           [1]   Patient Active Problem List  Diagnosis    COVID-19 virus infection   [2]   No current facility-administered medications on file prior to encounter.     Current Outpatient Medications on File Prior to Encounter   Medication Sig Dispense Refill    albuterol (PROVENTIL HFA) 90 mcg/actuation inhaler Inhale 2 puffs into the lungs every 6 (six) hours as needed for Wheezing or Shortness of Breath. Rescue 18 g 0    amLODIPine (NORVASC) 2.5 MG tablet Take 2.5 mg by mouth once daily.      amLODIPine (NORVASC) 5 MG tablet Take 5 mg by mouth.      azithromycin (Z-GIO) 250 MG tablet Take 2 tablets by mouth on day 1; Take 1 tablet by mouth on days 2-5 6 tablet 0    benzonatate (TESSALON) 100 MG capsule Take 100 mg by mouth 3 (three) times daily.      buPROPion (WELLBUTRIN SR) 150 MG TBSR 12 hr tablet       buPROPion (WELLBUTRIN XL) 150 MG TB24 tablet Take 150 mg by mouth every morning.      clindamycin (CLEOCIN T) 1 % lotion Apply topically 2 (two) times daily.      diazePAM (VALIUM) 2 MG tablet Take 1 tablet (2 mg total) by mouth every 6 (six) hours as needed for Anxiety. 3 tablet 0    fluticasone propionate (FLONASE) 50 mcg/actuation nasal spray 2 sprays each nostril once to twice daily 16 g 11    ipratropium (ATROVENT) 42 mcg (0.06 %) nasal spray 2 sprays by Nasal route 3 (three) times daily as needed for Rhinitis (runny nose or post nasal drip). 15 mL 5    loratadine (CLARITIN) 10 mg tablet Take 10 mg by mouth once daily.      omeprazole (PRILOSEC) 10 MG capsule Take 10 mg by mouth once daily.      predniSONE (DELTASONE) 20 MG tablet Take 20 mg by mouth.      sertraline (ZOLOFT) 100 MG tablet Take 100 mg by mouth once daily.     [3]   Social History  Socioeconomic History    Marital status: Single   Tobacco Use    Smoking status: Never    Smokeless tobacco: Never   Substance and Sexual Activity    Alcohol use: Yes      Comment: social      Social Drivers of Health     Financial Resource Strain: Low Risk  (1/26/2025)    Overall Financial Resource Strain (CARDIA)     Difficulty of Paying Living Expenses: Not very hard   Food Insecurity: No Food Insecurity (1/26/2025)    Hunger Vital Sign     Worried About Running Out of Food in the Last Year: Never true     Ran Out of Food in the Last Year: Never true   Physical Activity: Insufficiently Active (1/26/2025)    Exercise Vital Sign     Days of Exercise per Week: 1 day     Minutes of Exercise per Session: 30 min   Stress: Stress Concern Present (1/26/2025)    [employer] - Occupational Stress Questionnaire     Feeling of Stress : Rather much   Housing Stability: Unknown (1/26/2025)    Housing Stability Vital Sign     Unable to Pay for Housing in the Last Year: No

## 2025-02-21 NOTE — TRANSFER OF CARE
"Anesthesia Transfer of Care Note    Patient: Meet Gilliam    Procedure(s) Performed: Procedure(s) (LRB):  ARTHROSCOPY, KNEE, WITH MENISCECTOMY PARTIAL LATERAL (Left)    Patient location: [facility]    Anesthesia Type: general and regional    Transport from OR: Transported from OR on room air with adequate spontaneous ventilation. Transported from OR on 6-10 L/min O2 by face mask with adequate spontaneous ventilation    Post pain: adequate analgesia    Post assessment: no apparent anesthetic complications and tolerated procedure well    Post vital signs: stable    Level of consciousness: awake    Nausea/Vomiting: no nausea/vomiting    Complications: none    Transfer of care protocol was followed      Last vitals: Visit Vitals  /64 (BP Location: Right arm, Patient Position: Lying)   Pulse 78   Temp 37.1 °C (98.7 °F) (Temporal)   Resp 14   Ht 5' 9" (1.753 m)   Wt 98 kg (216 lb)   SpO2 98%   BMI 31.90 kg/m²     "

## 2025-02-21 NOTE — BRIEF OP NOTE
[facility] - Surgery (Hospital)  Brief Operative Note    Surgery Date: 2/21/2025     Surgeons and Role:     * KAREN Taylor MD - Primary    Assisting Surgeon:   MD Isra Duarte PA-C    Pre-op Diagnosis:  Acute medial meniscus tear of left knee, initial encounter [S83.242A]  Acute lateral meniscus tear of left knee, initial encounter [S83.282A]    Post-op Diagnosis:  Post-Op Diagnosis Codes:     * Acute medial meniscus tear of left knee, initial encounter [S83.242A]     * Acute lateral meniscus tear of left knee, initial encounter [S83.282A]    Procedure(s) (LRB):  ARTHROSCOPY, KNEE, WITH MENISCECTOMY PARTIAL LATERAL (Left)    Anesthesia: General    Operative Findings:   Free edge tearing lateral meniscus  Intact medial meniscus  No significant chondromalacia    Estimated Blood Loss: * No values recorded between 2/21/2025 12:51 PM and 2/21/2025  1:35 PM *         Specimens:   Specimen (24h ago, onward)      None              Discharge Note    OUTCOME: Patient tolerated treatment/procedure well without complication and is now ready for discharge.    DISPOSITION: Home or Self Care    FINAL DIAGNOSIS:    Left knee pain  Left knee lateral meniscus tear    FOLLOWUP: In clinic    DISCHARGE INSTRUCTIONS:    Discharge Procedure Orders   BASIC METABOLIC PANEL   Standing Status: Future Standing Exp. Date: 05/12/26     Order Specific Question Answer Comments   Send normal result to authorizing provider's In Basket if patient is active on MyChart: Yes

## 2025-02-21 NOTE — ANESTHESIA POSTPROCEDURE EVALUATION
Anesthesia Post Evaluation    Patient: Meet Gilliam    Procedure(s) Performed: Procedure(s) (LRB):  ARTHROSCOPY, KNEE, WITH MENISCECTOMY PARTIAL LATERAL (Left)    Final Anesthesia Type: general      Patient location during evaluation: PACU  Patient participation: Yes- Able to Participate  Level of consciousness: awake and alert and oriented  Post-procedure vital signs: reviewed and stable  Pain management: adequate  Airway patency: patent    PONV status at discharge: No PONV  Anesthetic complications: no      Cardiovascular status: hemodynamically stable  Respiratory status: nasal cannula  Hydration status: euvolemic  Follow-up not needed.          Vitals Value Taken Time   /71 02/21/25 15:31   Temp 36.6 °C (97.9 °F) 02/21/25 15:30   Pulse 83 02/21/25 15:40   Resp 12 02/21/25 15:40   SpO2 99 % 02/21/25 15:40   Vitals shown include unfiled device data.      No case tracking events are documented in the log.      Pain/Javi Score: Pain Rating Prior to Med Admin: 6 (2/21/2025  2:23 PM)  Pain Rating Post Med Admin: 0 (2/21/2025 12:10 PM)  Javi Score: 10 (2/21/2025  2:00 PM)

## 2025-02-21 NOTE — PLAN OF CARE
Patient is AAO and VSS.  Tolerating PO and states pain is tolerable.  Dressing CDI.  Patient states they are ready for d/c.  IV removed.  Catheter tip intact.  Caregiver at bedside.  Discharge instructions reviewed and copy given to the patient and caregiver.  Questions answered.  Both verbalized understanding.  Medication delivered to bedside. Patient and mom taught regarding pain pump. Both verbalize understanding. no DME needed.  Patient wheeled to car by RN/PCT.

## 2025-02-21 NOTE — ANESTHESIA PROCEDURE NOTES
Intubation    Date/Time: 2/21/2025 12:29 PM    Performed by: Freida Henry CRNA  Authorized by: Viky Orozco MD    Intubation:     Induction:  Intravenous    Intubated:  Postinduction    Mask Ventilation:  Not attempted    Attempts:  1    Attempted By:  CRNA    Difficult Airway Encountered?: No      Complications:  None    Airway Device:  Supraglottic airway/LMA    Airway Device Size:  5.0    Style/Cuff Inflation:  Cuffed    Inflation Amount (mL):  0    Secured at:  The lips    Placement Verified By:  Capnometry    Complicating Factors:  None    Findings Post-Intubation:  BS equal bilateral and atraumatic/condition of teeth unchanged

## 2025-02-21 NOTE — ANESTHESIA PROCEDURE NOTES
Adductor canal catheter    Patient location during procedure: pre-op   Block not for primary anesthetic.  Reason for block: at surgeon's request and post-op pain management   Post-op Pain Location: left knee   Start time: 2/21/2025 11:41 AM  Timeout: 2/21/2025 11:40 AM   End time: 2/21/2025 11:46 AM    Staffing  Authorizing Provider: Viky Orozco MD  Performing Provider: Viky Orozco MD    Staffing  Performed by: Viky Orozco MD  Authorized by: Viky Orozco MD    Preanesthetic Checklist  Completed: patient identified, IV checked, site marked, risks and benefits discussed, surgical consent, monitors and equipment checked, pre-op evaluation and timeout performed  Peripheral Block  Patient position: supine  Prep: ChloraPrep and site prepped and draped  Patient monitoring: heart rate, cardiac monitor, continuous pulse ox, continuous capnometry and frequent blood pressure checks  Block type: adductor canal  Laterality: left  Injection technique: continuous  Needle  Needle type: Tuohy   Needle gauge: 17 G  Needle length: 3.5 in  Needle localization: anatomical landmarks and ultrasound guidance  Catheter type: spring wound  Catheter size: 19 G  Test dose: lidocaine 1.5% with Epi 1-to-200,000 and negative   -ultrasound image captured on disc.  Assessment  Injection assessment: negative aspiration, negative parasthesia and local visualized surrounding nerve  Paresthesia pain: none  Heart rate change: no  Slow fractionated injection: yes  Pain Tolerance: comfortable throughout block and no complaints  Medications:    Medications: ropivacaine (NAROPIN) injection 0.5% - Perineural   7.5 mL - 2/21/2025 11:45:00 AM    Additional Notes  VSS.  [facility] RN monitoring vitals throughout procedure.  Patient tolerated procedure well.

## 2025-02-22 NOTE — OP NOTE
[facility]   OPERATIVE REPORT   ORTHOPAEDIC SURGERY   PROVIDER: DR. JUNE HOUSE    PATIENT INFORMATION   Meet Gilliam 29 y.o. male 1995   MRN: [id number]   LOCATION: [facility]     DATE OF PROCEDURE: 2/21/2025     PREOPERATIVE DIAGNOSES:   Left  1. knee suspected medial and lateral meniscus tear   2. knee posterolateral corner injury  3. knee lateral tibial plateau and fibular head fractures     POSTOPERATIVE DIAGNOSES:   Left  1. knee minimal free edge midbody lateral meniscus tear   2. knee posterolateral corner injury, low-grade and stable  3. knee lateral tibial plateau and fibular head fractures     PROCEDURE PERFORMED:   Left  1. knee arthroscopic partial lateral meniscectomy   2. knee closed treatment of lateral tibial plateau and fibular head fractures    SURGEON: JUNE House MD     ASSISTANTS:  Dhruv Damon MD - Fellow - First Assist  STACEY Norris     First Assistant Duties: A first assistant was necessary for this procedure. Assistance was provided for surgical wound exposure, manipulation, and retractor placement and positioning. There was no qualified resident available for the procedure.      ANESTHESIA: General with local anesthetic injection (0.2% Naropin)    ESTIMATED BLOOD LOSS: Minimal    IMPLANTS: * No implants in log *     SPECIMENS: None.    COMPLICATIONS: None.     INTRAOPERATIVE COUNTS: Correct.     PROPHYLACTIC IV ANTIBIOTICS: Given per [facility] Protocol.    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  Imaging has shown lateral tibial plateau and fibular head fractures with some degree of low-grade posterolateral corner injury.  Additionally there was some concern for both medial and lateral meniscus pathology.  The patient is currently nonweightbearing given the plateau fracture and he has been indicated for arthroscopic intervention for further evaluation of  suspected meniscus pathology.  Full informed consent was obtained prior to proceeding.    PROCEDURE IN DETAIL:  The patient was identified in preop holding. Permit was obtained for the above procedure. IV antibiotic was initiated for prophylaxis and the patient was brought to the operating room.       After Anesthesia was administered, a Time Out was verbalized with all OR staff agreeing to the patient and procedure.      The left knee and leg were prepped and draped in the usual sterile fashion.      Diagnostic arthroscopy was undertaken after establishing routine anteromedial and anterolateral scope portals.      Significant Findings:  1. Patellofemoral compartment - Intact patellofemoral compartment.  No articular damage of the patella or trochlea.  Central patellar tracking.  No significant lateral patellar tilt or subluxation.  2. Notch - Normal ACL and PCL  3. Medial Compartment - Meniscus, intact.  Close examination of the meniscus was performed with multiple pictures taken.  A modified Gillquist maneuver also was performed to exam of the meniscal capsular junction posteriorly.  No tear was seen. Cartilage, intact.    4. Lateral compartment - Meniscus, very small area of free edge tearing and fraying of the midbody.  Cartilage, intact.  No articular cartilage break was seen to indicate underlying lateral tibial plateau fracture.    5. Loose bodies - None.  6. Other - None.    Detailed description:     1. Meniscectomy:  The arthroscopic shaver was introduced to gently debride the free edge margin of the midbody lateral meniscus for partial meniscectomy.  Debridement was carried back to a stable, well contoured margin.  Otherwise no other treatment was indicated.      Ligamentous exam of the knee prior to the arthroscopic portion demonstrated good stability to varus stress at 0 and 30°.  Negative supine dial test.  There was no evidence of posterolateral corner instability.  Patient will be treated  nonoperatively for his fractures.    Photos were taken. The portals were closed in standard fashion using 3-0 Nylon suture.    The dressing was placed after local was administered subcutaneously and the patient was awakened and transferred to the recovery room in satisfactory condition.  There were no apparent complications.     POSTOPERATIVE PLAN: Patient we will remain toe-touch weight-bearing until at least 6 weeks out from his date of injury.  On March 10, the plan would be to begin gradual progressive weight-bearing to tolerance and come off the crutches by 8 weeks post-injury. Full range of motion to tolerance. Will start physical therapy right away. ASA 81 mg BID x 2 weeks for DVT prophylaxis.

## 2025-02-24 ENCOUNTER — CLINICAL SUPPORT (OUTPATIENT)
Dept: REHABILITATION | Facility: HOSPITAL | Age: 30
End: 2025-02-24
Attending: PHYSICIAN ASSISTANT
Payer: COMMERCIAL

## 2025-02-24 DIAGNOSIS — S83.282A ACUTE LATERAL MENISCUS TEAR OF LEFT KNEE, INITIAL ENCOUNTER: ICD-10-CM

## 2025-02-24 DIAGNOSIS — Z98.890 S/P LATERAL MENISCECTOMY OF LEFT KNEE: Primary | ICD-10-CM

## 2025-02-24 DIAGNOSIS — S83.242A ACUTE MEDIAL MENISCUS TEAR OF LEFT KNEE, INITIAL ENCOUNTER: ICD-10-CM

## 2025-02-24 PROCEDURE — 97161 PT EVAL LOW COMPLEX 20 MIN: CPT | Performed by: PHYSICAL THERAPIST

## 2025-02-24 PROCEDURE — 97112 NEUROMUSCULAR REEDUCATION: CPT | Performed by: PHYSICAL THERAPIST

## 2025-02-24 NOTE — PROGRESS NOTES
Outpatient Rehab    Physical Therapy Evaluation    Patient Name: Meet Gilliam  MRN: [id number]  YOB: 1995  Encounter Date: 2/24/2025    Therapy Diagnosis:   Encounter Diagnoses   Name Primary?    Acute medial meniscus tear of left knee, initial encounter     Acute lateral meniscus tear of left knee, initial encounter     S/P lateral meniscectomy of left knee Yes     Physician: Isra Fox*    Physician Orders: Eval and Treat  Medical Diagnosis:   S83.242A (ICD-10-CM) - Acute medial meniscus tear of left knee, initial encounter   S83.282A (ICD-10-CM) - Acute lateral meniscus tear of left knee, initial encounter   Visit # / Visits Authorized:  1 / 1   Date of Evaluation:  2/24/2025   Insurance Authorization Period: 02/25/2025 to 12/31/2025  Plan of Care Certification:  2/24/2025 to 6/25/2025      Time In: 1335   Time Out: 1430  Total Time: 55   Total Billable Time: 55    Intake Outcome Measure for FOTO Survey    Therapist reviewed FOTO scores for Meet Gilliam on 2/24/2025.   FOTO report - see Media section or FOTO account episode details.     Intake Score: 29%         Subjective   History of Present Illness  Meet is a 29 y.o. male who reports to physical therapy with a chief concern of s/p lateral meniscectomy.     The patient reports a medical diagnosis of S83.242A (ICD-10-CM) - Acute medial meniscus tear of left knee, initial encounter  S83.282A (ICD-10-CM) - Acute lateral meniscus tear of left knee, initial encounter.  Patient reports a surgery of Left 1. knee arthroscopic partial lateral meniscectomy 2. knee closed treatment of lateral tibial plateau and fibular head fractures. Surgery occurred on 02/21/25.         History of Present Condition/Illness: Pt is a 30 y/o male presenting s/p L lateral meniscectomy with fractures of the fibula and tibia plateau and a low grade posterolateral corner injury. 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 He has not walked on his knee since the incident, so he has not had any locking incidents since the injury. Pt reports he feels pretty good today with low levels of pain which was a vast improvement from the first three days after surgery. He denies any red flags or numbness/tingling. He has been icing, elevating, and completing his hep which includes quad sets, SLR, heel slides, and plantar flexion.     Pain     Patient reports a current pain level of 3/10. Pain at best is reported as 1/10. Pain at worst is reported as 5/10.   Location: anteiror knee  Clinical Progression (since onset): Stable  Pain Qualities: Aching, Discomfort, Tightness  Pain-Relieving Factors: Change in position, Rest, Ice, Other (Comment)  Other Pain-Relieving Factors: elevation  Pain-Aggravating Factors: Bending         Living Arrangements  Living Situation  Housing: Home independently  Living Arrangements: Alone    Mother staying with him until thursday      Employment  Patient does not report that: Does the patient's condition impact their ability to work?      In between jobs right now and start date pushed back until march 10th; works remotely on computer      Past Medical History/Physical Systems Review:   Meet Gilliam  has a past medical history of Allergy, Hypertension, and Mixed hyperlipidemia.    Meet Gilliam  has a past surgical history that includes Knee arthroscopy w/ meniscectomy (Left, 2/21/2025).    Meet has a current medication list which includes the following prescription(s): albuterol, amlodipine, aspirin, benzonatate, bupropion, clindamycin, cyanocobalamin, fluticasone propionate, ipratropium, loratadine, omeprazole, ondansetron, and oxycodone.    Review of patient's allergies indicates:  No Known Allergies     Objective   Posture                 Left knee position is: Flexed         Functional Tests:  Gait: NWB with bilateral axillary  "crutches    ROM  (Hyper/ Ext/ Flex) Left  Right    Passive 5 - 0 - 115 10 - 0 - 135   Active  5 - 0 - 80 10 - 0 - 135     Quad Set: poor as expected post op    Joint Mobility: limited as expected post op; good patellar mobility      Palpation: TTP to anterior patella     Sensation: as expected post op     Edema: as expected post op        Treatment:  Balance/Neuromuscular Re-Education  Balance/Neuromuscular Re-Education Activity 1: prop stretch 10'  Balance/Neuromuscular Re-Education Activity 2: quad sets 30x5" holds  Balance/Neuromuscular Re-Education Activity 3: PF GTB 3x10  Balance/Neuromuscular Re-Education Activity 4: hamstring stretch 5x30" holds  Balance/Neuromuscular Re-Education Activity 5: heel slides 3'    Assessment & Plan   Assessment  Meet presents with a condition of Low complexity.   Presentation of Symptoms: Stable  Will Comorbidities Impact Care: No       Functional Limitations: Activity tolerance, Pain with ADLs/IADLs, Painful locomotion/ambulation, Participating in leisure activities, Decreased ambulation distance/endurance  Impairments: Pain with functional activity, Impaired physical strength    Patient Goal for Therapy (PT): to return to performing in drag shows  Prognosis: Excellent  Assessment Details: Meet is a 30 y/o male referred to outpatient Physical Therapy s/p L lateral meniscectomy with fractures of the fibula and tibia plateau and a low grade posterolateral corner injury. Patient presents with decreased L knee ROM, decreased L knee mobility, decreased L knee strength, decreased L quad activation, decreased functional capabilities, and decreased performance of ADLs/IADLs. Pt presented with good patellar mobility and displayed hyperextension by the end of the session after manual therapy and propping.    Plan  From a physical therapy perspective, the patient would benefit from: Skilled Rehab Services    Planned therapy interventions include: Therapeutic exercise, Therapeutic " activities, Neuromuscular re-education, Manual therapy, and ADLs/IADLs.    Planned modalities to include: Biofeedback, Electrical stimulation - attended, Electrical stimulation - passive/unattended, Thermotherapy (hot pack), and Cryotherapy (cold pack).        Visit Frequency: 2 times Per Week for 4 Months.       This plan was discussed with Patient.   Discussion participants: Agreed Upon Plan of Care             Patient's spiritual, cultural, and educational needs considered and patient agreeable to plan of care and goals.     Education  Education was done with Patient. The patient's learning style includes Demonstration, Listening, and Pictures/video. The patient Demonstrates understanding and Verbalizes understanding.         HEP, surgery, prognosis and expectations for physical therapy       Goals:   Active       Long Term Goals       1.Report decreased L knee pain < / = 0/10  to increase tolerance for drag performances        Start:  02/25/25    Expected End:  06/25/25            2.Patient goal: to return to drag performances       Start:  02/25/25    Expected End:  06/25/25            3.Increase strength to 90% LSI to increase tolerance for ADL and work activities.       Start:  02/25/25    Expected End:  06/25/25            4. Pt will report at CJ level (20-40% impaired) on FOTO knee to demonstrate increase in LE function with every day tasks.        Start:  02/25/25    Expected End:  06/25/25               Mid Term Goals       1.Report decreased L  knee pain  < / =  1/10  to increase tolerance for ADLs       Start:  02/25/25    Expected End:  04/08/25            2. Increase knee ROM to symmetrical with R knee in order to be able to perform ADLs without difficulty.        Start:  02/25/25    Expected End:  04/08/25            3. Pt to tolerate HEP to improve ROM and independence with ADL's       Start:  02/25/25    Expected End:  04/08/25               Short Term Goals       1. Display a SLR without a quad  lag in the L knee       Start:  02/25/25    Expected End:  03/11/25            2. Display full L knee hyperextension symmetrical with the R knee.       Start:  02/25/25    Expected End:  03/11/25                Jean Cisse, PT, DPT  Co-Treat with Ambrose Rivas PT, DPT, OCS, FAAOMPT

## 2025-02-25 ENCOUNTER — PATIENT MESSAGE (OUTPATIENT)
Dept: SPORTS MEDICINE | Facility: CLINIC | Age: 30
End: 2025-02-25
Payer: COMMERCIAL

## 2025-02-28 ENCOUNTER — CLINICAL SUPPORT (OUTPATIENT)
Dept: REHABILITATION | Facility: HOSPITAL | Age: 30
End: 2025-02-28
Payer: COMMERCIAL

## 2025-02-28 DIAGNOSIS — Z98.890 S/P LATERAL MENISCECTOMY OF LEFT KNEE: Primary | ICD-10-CM

## 2025-02-28 PROCEDURE — 97110 THERAPEUTIC EXERCISES: CPT | Performed by: PHYSICAL THERAPIST

## 2025-02-28 PROCEDURE — 97112 NEUROMUSCULAR REEDUCATION: CPT | Performed by: PHYSICAL THERAPIST

## 2025-02-28 NOTE — PROGRESS NOTES
"  Outpatient Rehab    Physical Therapy Visit    Patient Name: Meet Gilliam  MRN: [id number]  YOB: 1995  Encounter Date: 2/28/2025    Therapy Diagnosis:   Encounter Diagnosis   Name Primary?    S/P lateral meniscectomy of left knee Yes     Physician: Isra Fox*    Physician Orders: Eval and Treat  Medical Diagnosis: S83.282A (ICD-10-CM) - Acute lateral meniscus tear of left knee, initial encounter     Visit # / Visits Authorized:  1 / 20   Date of Evaluation:  2/25/2025  Insurance Authorization Period: 2/25/2025 to 12/31/2025  Plan of Care Certification:  2/28/2025 to 6/30/2025      Time In: 1335   Time Out: 1434  Total Time: 59   Total Billable Time: 50    FOTO:  Intake Score:  %  Survey Score 1:  %  Survey Score 2:  %         Subjective   Pain is doing better. Had a slight increase when the pain pump came out. Has normalized some.  Pain reported as 3/10.      Objective            Treatment:  Therapeutic Exercise  Therapeutic Exercise Activity 1: Heel slide 3'  Therapeutic Exercise Activity 2: Tail gaiter 3'  Therapeutic Exercise Activity 3: HS str 10" x 5         Balance/Neuromuscular Re-Education  Balance/Neuromuscular Re-Education Activity 1: Ankle pump with heel elevated 3 x 20, Gray TB  Balance/Neuromuscular Re-Education Activity 2: quad sets 30x5" holds  Balance/Neuromuscular Re-Education Activity 3: Biofeedback with quad set and SLR 5' ea  Balance/Neuromuscular Re-Education Activity 4: LAQ 3 X 20                        Assessment & Plan   Assessment: Progressing well ROM and SLR control. Able to complete SLR without lag when he is cued. Updated HEP. Overall progressing well.       Patient will continue to benefit from skilled outpatient physical therapy to address the deficits listed in the problem list box on initial evaluation, provide pt/family education and to maximize pt's level of independence in the home and community environment.     Patient's spiritual, cultural, and " educational needs considered and patient agreeable to plan of care and goals.           Plan: Continue with POC.    Goals:   Active       Long Term Goals       1.Report decreased L knee pain < / = 0/10  to increase tolerance for drag performances        Start:  02/25/25    Expected End:  06/25/25            2.Patient goal: to return to drag performances       Start:  02/25/25    Expected End:  06/25/25            3.Increase strength to 90% LSI to increase tolerance for ADL and work activities.       Start:  02/25/25    Expected End:  06/25/25            4. Pt will report at CJ level (20-40% impaired) on FOTO knee to demonstrate increase in LE function with every day tasks.        Start:  02/25/25    Expected End:  06/25/25               Mid Term Goals       1.Report decreased L  knee pain  < / =  1/10  to increase tolerance for ADLs       Start:  02/25/25    Expected End:  04/08/25            2. Increase knee ROM to symmetrical with R knee in order to be able to perform ADLs without difficulty.        Start:  02/25/25    Expected End:  04/08/25            3. Pt to tolerate HEP to improve ROM and independence with ADL's       Start:  02/25/25    Expected End:  04/08/25               Short Term Goals       1. Display a SLR without a quad lag in the L knee       Start:  02/25/25    Expected End:  03/11/25            2. Display full L knee hyperextension symmetrical with the R knee.       Start:  02/25/25    Expected End:  03/11/25                Ambrose Rivas, PT, DPT

## 2025-03-05 ENCOUNTER — CLINICAL SUPPORT (OUTPATIENT)
Dept: REHABILITATION | Facility: HOSPITAL | Age: 30
End: 2025-03-05
Payer: COMMERCIAL

## 2025-03-05 DIAGNOSIS — Z98.890 S/P LATERAL MENISCECTOMY OF LEFT KNEE: Primary | ICD-10-CM

## 2025-03-05 PROCEDURE — 97110 THERAPEUTIC EXERCISES: CPT

## 2025-03-05 PROCEDURE — 97112 NEUROMUSCULAR REEDUCATION: CPT

## 2025-03-05 NOTE — PROGRESS NOTES
"  Outpatient Rehab    Physical Therapy Visit    Patient Name: Meet Gilliam  MRN: [id number]  YOB: 1995  Encounter Date: 3/5/2025    Therapy Diagnosis:   Encounter Diagnosis   Name Primary?    S/P lateral meniscectomy of left knee Yes     Physician: Isra Fox*    Physician Orders: Eval and Treat  Medical Diagnosis: S83.282A (ICD-10-CM) - Acute lateral meniscus tear of left knee, initial encounter   Visit # / Visits Authorized:  2 / 20   Date of Evaluation:  2/25/2025  Insurance Authorization Period: 2/25/2025 to 12/31/2025  Plan of Care Certification:  2/28/2025 to 6/30/2025      Time In: 1003   Time Out: 1100  Total Time: 57   Total Billable Time: 57    FOTO:  Intake Score: 29%  Survey Score 1:  %  Survey Score 2:  %         Subjective   doing much better; reports pain comes and goes but it is better than last time..  Pain reported as 3/10.      Objective    Will update at progress report as tolerated.         Treatment:  Therapeutic Exercise  Therapeutic Exercise Activity 1: Heel slide 3'  Therapeutic Exercise Activity 2: Tail gaiter 3'  Therapeutic Exercise Activity 3: HS str 30" x 5    Manual Therapy  Manual Therapy Activity 1: AP femur on tibia mobilizations; gr III-IV  Manual Therapy Activity 2: hinge mobilizations; gr III-IV    Balance/Neuromuscular Re-Education  Balance/Neuromuscular Re-Education Activity 1: Ankle pump with heel elevated 3 x 20, Gray TB  Balance/Neuromuscular Re-Education Activity 2: quad sets 30x5" holds  Balance/Neuromuscular Re-Education Activity 3: SLR 3x10  Balance/Neuromuscular Re-Education Activity 4: NMES 15' mid range LAQ 50% MVIC (7.5 kgs)    Assessment & Plan   Assessment: Progressed with quad strengthning with NMES today which he tolerated well with no increase in pain. Pt reported with ner full hyperextension and good patellar mobility. Able to complete SLR with no quad lag today. Will continue to progress per protocol; excellent progress so " far.  Evaluation/Treatment Tolerance: Patient tolerated treatment well    Patient will continue to benefit from skilled outpatient physical therapy to address the deficits listed in the problem list box on initial evaluation, provide pt/family education and to maximize pt's level of independence in the home and community environment.     Patient's spiritual, cultural, and educational needs considered and patient agreeable to plan of care and goals.     Education  Education was done with Patient. The patient's learning style includes Demonstration and Listening. The patient Demonstrates understanding and Verbalizes understanding.         HEP, surgery, prognosis and expectations for physical therapy       Plan: Continue with POC.    Goals:   Active       Long Term Goals       1.Report decreased L knee pain < / = 0/10  to increase tolerance for drag performances  (Progressing)       Start:  02/25/25    Expected End:  06/25/25            2.Patient goal: to return to drag performances (Progressing)       Start:  02/25/25    Expected End:  06/25/25            3.Increase strength to 90% LSI to increase tolerance for ADL and work activities. (Progressing)       Start:  02/25/25    Expected End:  06/25/25            4. Pt will report at CJ level (20-40% impaired) on FOTO knee to demonstrate increase in LE function with every day tasks.  (Progressing)       Start:  02/25/25    Expected End:  06/25/25               Mid Term Goals       1.Report decreased L  knee pain  < / =  1/10  to increase tolerance for ADLs (Progressing)       Start:  02/25/25    Expected End:  04/08/25            2. Increase knee ROM to symmetrical with R knee in order to be able to perform ADLs without difficulty.  (Progressing)       Start:  02/25/25    Expected End:  04/08/25            3. Pt to tolerate HEP to improve ROM and independence with ADL's (Progressing)       Start:  02/25/25    Expected End:  04/08/25              Resolved       Short Term  Goals       1. Display a SLR without a quad lag in the L knee (Met)       Start:  02/25/25    Expected End:  03/11/25    Resolved:  03/05/25         2. Display full L knee hyperextension symmetrical with the R knee. (Met)       Start:  02/25/25    Expected End:  03/11/25    Resolved:  03/05/25             Jean Cisse, PT, DPT

## 2025-03-06 ENCOUNTER — OFFICE VISIT (OUTPATIENT)
Dept: SPORTS MEDICINE | Facility: CLINIC | Age: 30
End: 2025-03-06
Payer: COMMERCIAL

## 2025-03-06 VITALS
WEIGHT: 209.75 LBS | SYSTOLIC BLOOD PRESSURE: 139 MMHG | HEIGHT: 69 IN | HEART RATE: 134 BPM | BODY MASS INDEX: 31.07 KG/M2 | DIASTOLIC BLOOD PRESSURE: 86 MMHG

## 2025-03-06 DIAGNOSIS — Z98.890 S/P KNEE SURGERY: Primary | ICD-10-CM

## 2025-03-06 PROCEDURE — 99999 PR PBB SHADOW E&M-EST. PATIENT-LVL IV: CPT | Mod: PBBFAC,,, | Performed by: PHYSICIAN ASSISTANT

## 2025-03-06 NOTE — PROGRESS NOTES
S:Meet Gilliam presents for post-operative evaluation.     DATE OF PROCEDURE: 2/21/2025      PROCEDURE PERFORMED:   Left  1. knee arthroscopic partial lateral meniscectomy   2. knee closed treatment of lateral tibial plateau and fibular head fractures    Meet Gilliam reports to be doing well 2wk s/p the above mentioned procedure. Denies fevers, chills, night sweats, chest pain, difficulty breathing, calf pain or tenderness. Going to PT 2xWeek at the [place] location. Seeing good progress daily. Pain levels are improving. Has d/c'd pain medication.     O: The incisions are healing well.  No signs of infection.  Sutures were removed. No significant pain or unusual tenderness. aaROM 0-110. Quad firing well.     A/P: Doing great. Incisions healing well. Ok to shower with incisions uncovered. No submerging at this point. Plan to follow the rehab plan as previously outlined. RTC in 4 weeks.     POSTOPERATIVE PLAN: Patient we will remain toe-touch weight-bearing until at least 6 weeks out from his date of injury.  On March 10, the plan would be to begin gradual progressive weight-bearing to tolerance and come off the crutches by 8 weeks post-injury. Full range of motion to tolerance. Will start physical therapy right away. ASA 81 mg BID x 2 weeks for DVT prophylaxis.

## 2025-03-11 ENCOUNTER — CLINICAL SUPPORT (OUTPATIENT)
Dept: REHABILITATION | Facility: HOSPITAL | Age: 30
End: 2025-03-11
Payer: COMMERCIAL

## 2025-03-11 DIAGNOSIS — Z98.890 S/P LATERAL MENISCECTOMY OF LEFT KNEE: Primary | ICD-10-CM

## 2025-03-11 PROCEDURE — 97112 NEUROMUSCULAR REEDUCATION: CPT | Performed by: PHYSICAL THERAPIST

## 2025-03-11 PROCEDURE — 97530 THERAPEUTIC ACTIVITIES: CPT | Performed by: PHYSICAL THERAPIST

## 2025-03-11 PROCEDURE — 97116 GAIT TRAINING THERAPY: CPT | Performed by: PHYSICAL THERAPIST

## 2025-03-11 NOTE — PROGRESS NOTES
"  Outpatient Rehab    Physical Therapy Visit    Patient Name: Meet Gilliam  MRN: [id number]  YOB: 1995  Encounter Date: 3/11/2025    Therapy Diagnosis:   Encounter Diagnosis   Name Primary?    S/P lateral meniscectomy of left knee Yes       Physician: Isra Fox*    Physician Orders: Eval and Treat  Medical Diagnosis: S83.282A (ICD-10-CM) - Acute lateral meniscus tear of left knee, initial encounter   Visit # / Visits Authorized:  2 / 20   Date of Evaluation:  2/25/2025  Insurance Authorization Period: 2/25/2025 to 12/31/2025  Plan of Care Certification:  2/28/2025 to 6/30/2025      Time In: 1400   Time Out: 1500  Total Time: 60   Total Billable Time: 57    FOTO:  Intake Score:  %  Survey Score 1:  %  Survey Score 2:  %         Subjective   Minimal pain. Progressing..  Pain reported as 1/10.      Objective    Will update at progress report as tolerated.         Treatment:            Balance/Neuromuscular Re-Education  NMR 2: quad sets 30x5" holds  NMR 3: SLR 3x10  NMR 6: Bridge 3 x 10  NMR 7: Bio feedback with quad set, SLR, and standing TKE 5" ea    Assessment & Plan   Assessment: Did well with treatment today. Prgressed to WBAT with bilateral crutches as per Dr. De La Cruz orders. Mild pain with weight bearing so we kept it light today. He struggles to maintain knee extension in stance so this will need to be a priority. Use Alter G for gait training as needed.       Patient will continue to benefit from skilled outpatient physical therapy to address the deficits listed in the problem list box on initial evaluation, provide pt/family education and to maximize pt's level of independence in the home and community environment.     Patient's spiritual, cultural, and educational needs considered and patient agreeable to plan of care and goals.             Plan: Progress through weightbearing from Bilateral crutchs to 1 crutch to no AD. 3 to 7 days at each stage assesing for increased pain, " swelling, or decreased motion.    Goals:   Active       Long Term Goals       1.Report decreased L knee pain < / = 0/10  to increase tolerance for drag performances  (Progressing)       Start:  02/25/25    Expected End:  06/25/25            2.Patient goal: to return to drag performances (Progressing)       Start:  02/25/25    Expected End:  06/25/25            3.Increase strength to 90% LSI to increase tolerance for ADL and work activities. (Progressing)       Start:  02/25/25    Expected End:  06/25/25            4. Pt will report at CJ level (20-40% impaired) on FOTO knee to demonstrate increase in LE function with every day tasks.  (Progressing)       Start:  02/25/25    Expected End:  06/25/25               Mid Term Goals       1.Report decreased L  knee pain  < / =  1/10  to increase tolerance for ADLs (Met)       Start:  02/25/25    Expected End:  04/08/25    Resolved:  03/16/25         2. Increase knee ROM to symmetrical with R knee in order to be able to perform ADLs without difficulty.  (Progressing)       Start:  02/25/25    Expected End:  04/08/25            3. Pt to tolerate HEP to improve ROM and independence with ADL's (Progressing)       Start:  02/25/25    Expected End:  04/08/25              Resolved       Short Term Goals       1. Display a SLR without a quad lag in the L knee (Met)       Start:  02/25/25    Expected End:  03/11/25    Resolved:  03/05/25         2. Display full L knee hyperextension symmetrical with the R knee. (Met)       Start:  02/25/25    Expected End:  03/11/25    Resolved:  03/05/25             Ambrose Rivas, PT, DPT, DPT

## 2025-03-13 ENCOUNTER — CLINICAL SUPPORT (OUTPATIENT)
Dept: REHABILITATION | Facility: HOSPITAL | Age: 30
End: 2025-03-13
Payer: COMMERCIAL

## 2025-03-13 DIAGNOSIS — Z98.890 S/P LATERAL MENISCECTOMY OF LEFT KNEE: Primary | ICD-10-CM

## 2025-03-13 PROCEDURE — 97140 MANUAL THERAPY 1/> REGIONS: CPT

## 2025-03-13 PROCEDURE — 97112 NEUROMUSCULAR REEDUCATION: CPT

## 2025-03-17 NOTE — PROGRESS NOTES
"  Outpatient Rehab    Physical Therapy Visit    Patient Name: Meet Gilliam  MRN: [id number]  YOB: 1995  Encounter Date: 3/13/2025    Therapy Diagnosis:   Encounter Diagnosis   Name Primary?    S/P lateral meniscectomy of left knee Yes         Physician: Isra Fox*    Physician Orders: Eval and Treat  Medical Diagnosis: S83.282A (ICD-10-CM) - Acute lateral meniscus tear of left knee, initial encounter   Visit # / Visits Authorized:  4 / 20   Date of Evaluation:  2/25/2025  Insurance Authorization Period: 2/25/2025 to 12/31/2025  Plan of Care Certification:  2/28/2025 to 6/30/2025      Time In: 1604   Time Out: 1658  Total Time: 54   Total Billable Time: 54    FOTO:  Intake Score: 29%  Survey Score 1:  %  Survey Score 2:  %         Subjective   Max reports tolerating ambulation better..         Objective    Will update at progress report as tolerated.         Treatment:  PT extender available to assist with treatment as needed    Therapeutic Exercise  TE 1: Heel slide 3'  TE 3: HS str 30" x 5    Manual Therapy  MT 1: AP femur on tibia mobilizations; gr III-IV  MT 2: hinge mobilizations; gr III-IV    Balance/Neuromuscular Re-Education  NMR 2: quad sets 30x5" holds  NMR 3: SLR 3x10  NMR 4: NMES 15' mid range LAQ 50% MVIC (7.5 kgs)    Assessment & Plan   Assessment: Max presented to therapy with decreased symptoms. He improved knee extension in gait by the end of session with verbal cueing, but remains difficult to perform continuously. Prioritize closed chain knee extension.       Patient will continue to benefit from skilled outpatient physical therapy to address the deficits listed in the problem list box on initial evaluation, provide pt/family education and to maximize pt's level of independence in the home and community environment.     Patient's spiritual, cultural, and educational needs considered and patient agreeable to plan of care and goals.             Plan: Progress through " weightbearing from Bilateral crutchs to 1 crutch to no AD. 3 to 7 days at each stage assesing for increased pain, swelling, or decreased motion.    Goals:   Active       Long Term Goals       1.Report decreased L knee pain < / = 0/10  to increase tolerance for drag performances  (Progressing)       Start:  02/25/25    Expected End:  06/25/25            2.Patient goal: to return to drag performances (Progressing)       Start:  02/25/25    Expected End:  06/25/25            3.Increase strength to 90% LSI to increase tolerance for ADL and work activities. (Progressing)       Start:  02/25/25    Expected End:  06/25/25            4. Pt will report at CJ level (20-40% impaired) on FOTO knee to demonstrate increase in LE function with every day tasks.  (Progressing)       Start:  02/25/25    Expected End:  06/25/25               Mid Term Goals       1.Report decreased L  knee pain  < / =  1/10  to increase tolerance for ADLs (Met)       Start:  02/25/25    Expected End:  04/08/25    Resolved:  03/16/25         2. Increase knee ROM to symmetrical with R knee in order to be able to perform ADLs without difficulty.  (Progressing)       Start:  02/25/25    Expected End:  04/08/25            3. Pt to tolerate HEP to improve ROM and independence with ADL's (Progressing)       Start:  02/25/25    Expected End:  04/08/25              Resolved       Short Term Goals       1. Display a SLR without a quad lag in the L knee (Met)       Start:  02/25/25    Expected End:  03/11/25    Resolved:  03/05/25         2. Display full L knee hyperextension symmetrical with the R knee. (Met)       Start:  02/25/25    Expected End:  03/11/25    Resolved:  03/05/25             Joshua Marroquin, PT, DPT

## 2025-03-18 ENCOUNTER — CLINICAL SUPPORT (OUTPATIENT)
Dept: REHABILITATION | Facility: HOSPITAL | Age: 30
End: 2025-03-18
Payer: COMMERCIAL

## 2025-03-18 DIAGNOSIS — Z98.890 S/P LATERAL MENISCECTOMY OF LEFT KNEE: Primary | ICD-10-CM

## 2025-03-18 PROCEDURE — 97116 GAIT TRAINING THERAPY: CPT | Performed by: PHYSICAL THERAPIST

## 2025-03-18 PROCEDURE — 97112 NEUROMUSCULAR REEDUCATION: CPT | Performed by: PHYSICAL THERAPIST

## 2025-03-18 PROCEDURE — 97110 THERAPEUTIC EXERCISES: CPT | Performed by: PHYSICAL THERAPIST

## 2025-03-18 NOTE — PROGRESS NOTES
"  Outpatient Rehab    Physical Therapy Visit    Patient Name: Meet Gilliam  MRN: [id number]  YOB: 1995  Encounter Date: 3/18/2025    Therapy Diagnosis:   No diagnosis found.        Physician: Isra Fox*    Physician Orders: Eval and Treat  Medical Diagnosis: S83.282A (ICD-10-CM) - Acute lateral meniscus tear of left knee, initial encounter   Visit # / Visits Authorized:  4 / 20   Date of Evaluation:  2/25/2025  Insurance Authorization Period: 2/25/2025 to 12/31/2025  Plan of Care Certification:  2/28/2025 to 6/30/2025      Time In: 1409   Time Out: 1510  Total Time: 61   Total Billable Time: 54    FOTO:  Intake Score:  %  Survey Score 1:  %  Survey Score 2:  %    Precautions  Left Lower Extremity Weight-Bearing Status: Weight-bearing as tolerated  POSTOPERATIVE PLAN: Patient we will remain toe-touch weight-bearing until at least 6 weeks out from his date of injury.  On March 10, the plan would be to begin gradual progressive weight-bearing to tolerance and come off the crutches by 8 weeks post-injury. Full range of motion to tolerance.      Subjective   Able to ambulate with bilateral crutches. Some pain. Possibly increased swelling..  Pain reported as 1/10.      Objective    Will update at progress report as tolerated.    Knee Range of Motion   Right Knee   Active (deg) Passive (deg) Pain   Flexion 135 135     Extension 0 0         Left Knee   Active (deg) Passive (deg) Pain   Flexion 130 130     Extension 0 0                     Treatment:  PT extender available to assist with treatment as needed    Therapeutic Exercise  TE 1: Heel slide 3'  TE 3: 10 x 10"    Manual Therapy  MT 1: AP femur on tibia mobilizations; gr III-IV  MT 2: hinge mobilizations; gr III-IV    Balance/Neuromuscular Re-Education  NMR 2: quad sets 30x5" holds  NMR 3: SLR 3x10  NMR 7: Bio feedback standing TKE 5" ea  NMR 8: bridge GTB 10 x 10"  NMR 9: Modified plank 30" x 5    Assessment & Plan   Assessment: ROM has " progressed as has his quad control. Did well with quad control in stance wiht biofeedback. Gait approprate in Alter G. Will reduce to 1 crutch.  Evaluation/Treatment Tolerance: Patient tolerated treatment well    Patient will continue to benefit from skilled outpatient physical therapy to address the deficits listed in the problem list box on initial evaluation, provide pt/family education and to maximize pt's level of independence in the home and community environment.     Patient's spiritual, cultural, and educational needs considered and patient agreeable to plan of care and goals.             Plan: Progress to 1 crutch with ambulation. Goal of no crutches after 7 days.    Goals:   Active       Long Term Goals       1.Report decreased L knee pain < / = 0/10  to increase tolerance for drag performances  (Progressing)       Start:  02/25/25    Expected End:  06/25/25            2.Patient goal: to return to drag performances (Progressing)       Start:  02/25/25    Expected End:  06/25/25            3.Increase strength to 90% LSI to increase tolerance for ADL and work activities. (Progressing)       Start:  02/25/25    Expected End:  06/25/25            4. Pt will report at CJ level (20-40% impaired) on FOTO knee to demonstrate increase in LE function with every day tasks.  (Progressing)       Start:  02/25/25    Expected End:  06/25/25               Mid Term Goals       1.Report decreased L  knee pain  < / =  1/10  to increase tolerance for ADLs (Met)       Start:  02/25/25    Expected End:  04/08/25    Resolved:  03/16/25         2. Increase knee ROM to symmetrical with R knee in order to be able to perform ADLs without difficulty.  (Progressing)       Start:  02/25/25    Expected End:  04/08/25            3. Pt to tolerate HEP to improve ROM and independence with ADL's (Met)       Start:  02/25/25    Expected End:  04/08/25    Resolved:  03/18/25           Resolved       Short Term Goals       1. Display a SLR  without a quad lag in the L knee (Met)       Start:  02/25/25    Expected End:  03/11/25    Resolved:  03/05/25         2. Display full L knee hyperextension symmetrical with the R knee. (Met)       Start:  02/25/25    Expected End:  03/11/25    Resolved:  03/05/25             Ambrose Rivas, PT, DPT, DPT

## 2025-03-20 ENCOUNTER — CLINICAL SUPPORT (OUTPATIENT)
Dept: REHABILITATION | Facility: HOSPITAL | Age: 30
End: 2025-03-20
Payer: COMMERCIAL

## 2025-03-20 DIAGNOSIS — Z98.890 S/P LATERAL MENISCECTOMY OF LEFT KNEE: Primary | ICD-10-CM

## 2025-03-20 PROCEDURE — 97530 THERAPEUTIC ACTIVITIES: CPT

## 2025-03-20 PROCEDURE — 97112 NEUROMUSCULAR REEDUCATION: CPT

## 2025-03-20 NOTE — PROGRESS NOTES
"  Outpatient Rehab    Physical Therapy Visit    Patient Name: Meet Gilliam  MRN: [id number]  YOB: 1995  Encounter Date: 3/20/2025    Therapy Diagnosis:   Encounter Diagnosis   Name Primary?    S/P lateral meniscectomy of left knee Yes     Physician: Isra Fox*    Physician Orders: Eval and Treat  Medical Diagnosis: Acute medial meniscus tear of left knee, initial encounter  Acute lateral meniscus tear of left knee, initial encounter    Visit # / Visits Authorized:  6 / 20  Date of Evaluation:  2/25/2025  Insurance Authorization Period: 2/25/2025 to 12/31/2025  Plan of Care Certification:  2/28/2025 to 6/30/2025      PT/PTA:     Number of PTA visits since last PT visit:   Time In: 1604   Time Out: 1651  Total Time: 47   Total Billable Time: 47    FOTO:  Intake Score: 29%  Survey Score 1:  %  Survey Score 2:  %         Subjective   He had no pain or soreness after changing to a single crutch last visit..         Objective            Treatment:  PT extender available to assist with treatment as needed    Therapeutic Exercise  TE 1: Heel slide 3'  Balance/Neuromuscular Re-Education  NMR 2: quad sets 30x5" holds  NMR 3: SLR 3x15  NMR 7: Bio feedback standing TKE 5" ea  NMR 8: bridge GTB 5x26y97"  Therapeutic Activity  TA 3: Ambulation in Alter G 75% BW L1.3 x 12 min    Time Entry(in minutes):  Neuromuscular Re-Education Time Entry: 32  Therapeutic Activity Time Entry: 12  Therapeutic Exercise Time Entry: 3    Assessment & Plan   Assessment: Meet demonstrated imporved quad activation with closed chain exercises. He was able to tolerate incease bodyweight for additional time on the Alter G treadmill. Overall, patient is progressing well.       Patient will continue to benefit from skilled outpatient physical therapy to address the deficits listed in the problem list box on initial evaluation, provide pt/family education and to maximize pt's level of independence in the home and community " environment.     Patient's spiritual, cultural, and educational needs considered and patient agreeable to plan of care and goals.           Plan: Progress to 1 crutch with ambulation. Goal of no crutches after 7 days.    Goals:   Active       Long Term Goals       1.Report decreased L knee pain < / = 0/10  to increase tolerance for drag performances  (Progressing)       Start:  02/25/25    Expected End:  06/25/25            2.Patient goal: to return to drag performances (Progressing)       Start:  02/25/25    Expected End:  06/25/25            3.Increase strength to 90% LSI to increase tolerance for ADL and work activities. (Progressing)       Start:  02/25/25    Expected End:  06/25/25            4. Pt will report at CJ level (20-40% impaired) on FOTO knee to demonstrate increase in LE function with every day tasks.  (Progressing)       Start:  02/25/25    Expected End:  06/25/25               Mid Term Goals       1.Report decreased L  knee pain  < / =  1/10  to increase tolerance for ADLs (Met)       Start:  02/25/25    Expected End:  04/08/25    Resolved:  03/16/25         2. Increase knee ROM to symmetrical with R knee in order to be able to perform ADLs without difficulty.  (Progressing)       Start:  02/25/25    Expected End:  04/08/25            3. Pt to tolerate HEP to improve ROM and independence with ADL's (Met)       Start:  02/25/25    Expected End:  04/08/25    Resolved:  03/18/25           Resolved       Short Term Goals       1. Display a SLR without a quad lag in the L knee (Met)       Start:  02/25/25    Expected End:  03/11/25    Resolved:  03/05/25         2. Display full L knee hyperextension symmetrical with the R knee. (Met)       Start:  02/25/25    Expected End:  03/11/25    Resolved:  03/05/25             Joshua Marroquin, PT, DPT

## 2025-03-25 ENCOUNTER — CLINICAL SUPPORT (OUTPATIENT)
Dept: REHABILITATION | Facility: HOSPITAL | Age: 30
End: 2025-03-25
Payer: COMMERCIAL

## 2025-03-25 DIAGNOSIS — Z98.890 S/P LATERAL MENISCECTOMY OF LEFT KNEE: Primary | ICD-10-CM

## 2025-03-25 PROCEDURE — 97530 THERAPEUTIC ACTIVITIES: CPT

## 2025-03-25 PROCEDURE — 97112 NEUROMUSCULAR REEDUCATION: CPT

## 2025-03-25 NOTE — PROGRESS NOTES
Outpatient Rehab    Physical Therapy Visit    Patient Name: Meet Gilliam  MRN: [id number]  YOB: 1995  Encounter Date: 3/25/2025    Therapy Diagnosis:   Encounter Diagnosis   Name Primary?    S/P lateral meniscectomy of left knee Yes     Physician: Isra Fox*    Physician Orders: Eval and Treat  Medical Diagnosis: Acute medial meniscus tear of left knee, initial encounter  Acute lateral meniscus tear of left knee, initial encounter    Visit # / Visits Authorized:  7 / 20  Date of Evaluation:  2/25/2025  Insurance Authorization Period: 2/25/2025 to 12/31/2025  Plan of Care Certification:  2/28/2025 to 6/30/2025      PT/PTA:     Number of PTA visits since last PT visit:   Time In: 1403   Time Out: 1457  Total Time: 54   Total Billable Time: 54    FOTO:  Intake Score:  %  Survey Score 1:  %  Survey Score 2:  %         Subjective   Meet reports that he had some slight increase in stiffness after last visit, but improved after he did his HEP that morning..  Pain reported as 0/10.      Objective            Treatment:  PT extender available to assist with treatment as needed    Therapeutic Exercise  TE 1: Heel slide 3'  Balance/Neuromuscular Re-Education  NMR 1: Mini kickstand lunges 3x10  NMR 2: SLS 4 x 30s  NMR 3: SLR 3x15  Therapeutic Activity  TA 3: Ambulation in Alter G % BW L1.3 x 16 min  TA 4: Small hurdles 6 x 5 laps  TA 5: Patient education on crutch use    Time Entry(in minutes):  Neuromuscular Re-Education Time Entry: 13  Therapeutic Activity Time Entry: 38  Therapeutic Exercise Time Entry: 3    Assessment & Plan   Assessment: Meet demonstrated mild sway in single leg balance. He required external cueing for anterior tibial translation on mini lunges. He showed excellent heel strike and knee extension when practicing gait on hurdles. He tolerated Alter G at 100% bodyweight well. He was educated on continued use of single crutch in community, but discontinue at house.        Patient will continue to benefit from skilled outpatient physical therapy to address the deficits listed in the problem list box on initial evaluation, provide pt/family education and to maximize pt's level of independence in the home and community environment.     Patient's spiritual, cultural, and educational needs considered and patient agreeable to plan of care and goals.           Plan: Progress to no crutches in community after 7 days.    Goals:   Active       Long Term Goals       1.Report decreased L knee pain < / = 0/10  to increase tolerance for drag performances  (Progressing)       Start:  02/25/25    Expected End:  06/25/25            2.Patient goal: to return to drag performances (Progressing)       Start:  02/25/25    Expected End:  06/25/25            3.Increase strength to 90% LSI to increase tolerance for ADL and work activities. (Progressing)       Start:  02/25/25    Expected End:  06/25/25            4. Pt will report at CJ level (20-40% impaired) on FOTO knee to demonstrate increase in LE function with every day tasks.  (Progressing)       Start:  02/25/25    Expected End:  06/25/25               Mid Term Goals       1.Report decreased L  knee pain  < / =  1/10  to increase tolerance for ADLs (Met)       Start:  02/25/25    Expected End:  04/08/25    Resolved:  03/16/25         2. Increase knee ROM to symmetrical with R knee in order to be able to perform ADLs without difficulty.  (Progressing)       Start:  02/25/25    Expected End:  04/08/25            3. Pt to tolerate HEP to improve ROM and independence with ADL's (Met)       Start:  02/25/25    Expected End:  04/08/25    Resolved:  03/18/25           Resolved       Short Term Goals       1. Display a SLR without a quad lag in the L knee (Met)       Start:  02/25/25    Expected End:  03/11/25    Resolved:  03/05/25         2. Display full L knee hyperextension symmetrical with the R knee. (Met)       Start:  02/25/25    Expected End:   03/11/25    Resolved:  03/05/25             Joshua Marroquin PT, DPT

## 2025-03-27 ENCOUNTER — CLINICAL SUPPORT (OUTPATIENT)
Dept: REHABILITATION | Facility: HOSPITAL | Age: 30
End: 2025-03-27
Payer: COMMERCIAL

## 2025-03-27 DIAGNOSIS — Z98.890 S/P LATERAL MENISCECTOMY OF LEFT KNEE: Primary | ICD-10-CM

## 2025-03-27 PROCEDURE — 97112 NEUROMUSCULAR REEDUCATION: CPT

## 2025-03-27 PROCEDURE — 97116 GAIT TRAINING THERAPY: CPT

## 2025-03-29 NOTE — PROGRESS NOTES
S:Meet Gilliam presents for post-operative evaluation.     DATE OF PROCEDURE: 2/21/2025   PROCEDURE PERFORMED:   Left  1. knee arthroscopic partial lateral meniscectomy   2. knee closed treatment of lateral tibial plateau and fibular head fractures    Meet Gilliam reports to be doing well 5 weeks 6 days the above mentioned procedure. Going to PT at the [place] location with Joshua.  He states the knee feels good.  Overall much better.  No pain at all since the 4 week point postop.  Getting back to baseline activity.    O:  Exam of the left knee shows well-healed incisions.  No significant swelling.  No effusion.  Good quadriceps activation.  Full active extension, active flexion to 130°.  No significant joint line tenderness.  Central patellar tracking.    A/P: Arthroscopic pictures were reviewed with the patient.  May progress to full activities as tolerates.  Focus on quad and functional strengthening.  I expect him to do well.  Posterolateral corner is stable clinically.  Return to clinic as needed.

## 2025-03-30 NOTE — PROGRESS NOTES
"  Outpatient Rehab    Physical Therapy Visit    Patient Name: Meet Gilliam  MRN: [id number]  YOB: 1995  Encounter Date: 3/27/2025    Therapy Diagnosis:   Encounter Diagnosis   Name Primary?    S/P lateral meniscectomy of left knee Yes     Physician: Isra Fox*    Physician Orders: Eval and Treat  Medical Diagnosis: Acute medial meniscus tear of left knee, initial encounter  Acute lateral meniscus tear of left knee, initial encounter    Visit # / Visits Authorized:  8 / 20  Date of Evaluation:  2/25/2025  Insurance Authorization Period: 2/25/2025 to 12/31/2025  Plan of Care Certification:  2/28/2025 to 6/30/2025      PT/PTA:     Number of PTA visits since last PT visit:   Time In: 1605   Time Out: 1654  Total Time: 49   Total Billable Time: 49    FOTO:  Intake Score: 29%  Survey Score 1:  %  Survey Score 2:  %         Subjective   Mete reports that his knee has felt good since discontinuing crutch use at his house..         Objective            Treatment:  PT extender available to assist with treatment as needed    Manual Therapy  MT 1: AP femur on tibia mobilizations; gr III-IV  MT 2: hinge mobilizations; gr III-IV  Balance/Neuromuscular Re-Education  NMR 1: Mini kickstand lunges 3x10  NMR 2: SLS 4 x 30s  NMR 7: Bio feedback quad sets/SLR/standing TKE 5" ea  NMR 8: Single leg bridges 3x10  Gait Training  GT 1: Treadmill 1.5 x 15 min    Time Entry(in minutes):  Gait Training Time Entry: 15  Manual Therapy Time Entry: 7  Neuromuscular Re-Education Time Entry: 27    Assessment & Plan   Assessment: Eva presented to therapy with no pain. He has been tolerating no crutches while ambulating without difficulty around his house and at the clinic. He was able to walk on the treadmill without compensatory patterns. He is progressing well.       Patient will continue to benefit from skilled outpatient physical therapy to address the deficits listed in the problem list box on initial evaluation, " provide pt/family education and to maximize pt's level of independence in the home and community environment.     Patient's spiritual, cultural, and educational needs considered and patient agreeable to plan of care and goals.           Plan: Progress to no crutches in community after 7 days.    Goals:   Active       Long Term Goals       1.Report decreased L knee pain < / = 0/10  to increase tolerance for drag performances  (Progressing)       Start:  02/25/25    Expected End:  06/25/25            2.Patient goal: to return to drag performances (Progressing)       Start:  02/25/25    Expected End:  06/25/25            3.Increase strength to 90% LSI to increase tolerance for ADL and work activities. (Progressing)       Start:  02/25/25    Expected End:  06/25/25            4. Pt will report at CJ level (20-40% impaired) on FOTO knee to demonstrate increase in LE function with every day tasks.  (Progressing)       Start:  02/25/25    Expected End:  06/25/25               Mid Term Goals       1.Report decreased L  knee pain  < / =  1/10  to increase tolerance for ADLs (Met)       Start:  02/25/25    Expected End:  04/08/25    Resolved:  03/16/25         2. Increase knee ROM to symmetrical with R knee in order to be able to perform ADLs without difficulty.  (Progressing)       Start:  02/25/25    Expected End:  04/08/25            3. Pt to tolerate HEP to improve ROM and independence with ADL's (Met)       Start:  02/25/25    Expected End:  04/08/25    Resolved:  03/18/25           Resolved       Short Term Goals       1. Display a SLR without a quad lag in the L knee (Met)       Start:  02/25/25    Expected End:  03/11/25    Resolved:  03/05/25         2. Display full L knee hyperextension symmetrical with the R knee. (Met)       Start:  02/25/25    Expected End:  03/11/25    Resolved:  03/05/25             Joshua Marroquin, PT, DPT

## 2025-04-01 ENCOUNTER — CLINICAL SUPPORT (OUTPATIENT)
Dept: REHABILITATION | Facility: HOSPITAL | Age: 30
End: 2025-04-01
Attending: PHYSICAL THERAPIST
Payer: COMMERCIAL

## 2025-04-01 DIAGNOSIS — Z98.890 S/P LATERAL MENISCECTOMY OF LEFT KNEE: Primary | ICD-10-CM

## 2025-04-01 PROCEDURE — 97112 NEUROMUSCULAR REEDUCATION: CPT | Performed by: PHYSICAL THERAPIST

## 2025-04-01 PROCEDURE — 97530 THERAPEUTIC ACTIVITIES: CPT | Performed by: PHYSICAL THERAPIST

## 2025-04-01 PROCEDURE — 97750 PHYSICAL PERFORMANCE TEST: CPT | Performed by: PHYSICAL THERAPIST

## 2025-04-03 ENCOUNTER — OFFICE VISIT (OUTPATIENT)
Dept: SPORTS MEDICINE | Facility: CLINIC | Age: 30
End: 2025-04-03
Payer: COMMERCIAL

## 2025-04-03 VITALS
BODY MASS INDEX: 31.06 KG/M2 | HEIGHT: 69 IN | WEIGHT: 209.69 LBS | DIASTOLIC BLOOD PRESSURE: 82 MMHG | HEART RATE: 109 BPM | SYSTOLIC BLOOD PRESSURE: 120 MMHG

## 2025-04-03 DIAGNOSIS — Z98.890 S/P KNEE SURGERY: Primary | ICD-10-CM

## 2025-04-03 PROCEDURE — 99999 PR PBB SHADOW E&M-EST. PATIENT-LVL III: CPT | Mod: PBBFAC,,, | Performed by: ORTHOPAEDIC SURGERY

## 2025-04-03 NOTE — PROGRESS NOTES
"  Outpatient Rehab    Physical Therapy Progress Note    Patient Name: Meet Gilliam  MRN: [id number]  YOB: 1995  Encounter Date: 4/1/2025    Therapy Diagnosis:   Encounter Diagnosis   Name Primary?    S/P lateral meniscectomy of left knee Yes     Physician: Isra Fox*    Physician Orders: Eval and Treat  Medical Diagnosis: Acute medial meniscus tear of left knee, initial encounter  Acute lateral meniscus tear of left knee, initial encounter    Visit # / Visits Authorized:  9 / 20  Insurance Authorization Period: 2/25/2025 to 12/31/2025  Date of Evaluation:  2/25/2025   Plan of Care Certification:  2/28/2025 to 6/30/2025      PT/PTA:     Number of PTA visits since last PT visit:   Time In: 1600   Time Out: 1700  Total Time: 60   Total Billable Time: 55    FOTO:  Intake Score: 29%  Survey Score 1: 74%  Survey Score 2:  %    Precautions     POSTOPERATIVE PLAN: Patient we will remain toe-touch weight-bearing until at least 6 weeks out from his date of injury.  On March 10, the plan would be to begin gradual progressive weight-bearing to tolerance and come off the crutches by 8 weeks post-injury. Full range of motion to tolerance.      Subjective   No longer using crutches at his house. No pain. Progressing well..  Pain reported as 0/10.      Objective            Knee Strength   Right Strength Right Pain Left Strength Left  Pain   Flexion (S2)           Prone Flexion           Extension (L3)             Tindeq testing demonstrated 21 kgs on L and 38 kgs on R for a LSI of 55%          Treatment:  Balance/Neuromuscular Re-Education  NMR 1: Mini kickstand lunges 3x10  NMR 2: SLS 4 x 30s  NMR 3: SLR 3x15  NMR 6: Bridge 3 x 10  NMR 7: Bio feedback quad sets/SLR/standing TKE 5" ea  NMR 8: Single leg bridges 3x10  NMR 9: Modified plank 30" x 5  Therapeutic Activity  TA 2: Standing TKE 3 x 10  TA 4: Small hurdles 6 x 5 laps  Gait Training  GT 1: Treadmill 1.5 x 15 min  Other Activities  Activity 1: " Tindeq testing for 15 minutes    Time Entry(in minutes):  Physical Performance Test (with Report) Time Entry: 15  Neuromuscular Re-Education Time Entry: 25  Therapeutic Activity Time Entry: 10    Assessment & Plan   Assessment: Meet is progressing well. ROM is progressing as expected and he has been able to gradually ween off crutches. Current plan is to DC crutches unless he feels he needs them for safety in a busy enviornment.  Evaluation/Treatment Tolerance: Patient tolerated treatment well    Patient will continue to benefit from skilled outpatient physical therapy to address the deficits listed in the problem list box on initial evaluation, provide pt/family education and to maximize pt's level of independence in the home and community environment.     Patient's spiritual, cultural, and educational needs considered and patient agreeable to plan of care and goals.           Plan: Progress to no crutches in community. Continue focus on strengthening 2x per week for 6 weeks.    Goals:   Active       Long Term Goals       1.Report decreased L knee pain < / = 0/10  to increase tolerance for drag performances  (Met)       Start:  02/25/25    Expected End:  06/25/25    Resolved:  04/02/25         2.Patient goal: to return to drag performances (Progressing)       Start:  02/25/25    Expected End:  06/25/25            3.Increase strength to 90% LSI to increase tolerance for ADL and work activities. (Progressing)       Start:  02/25/25    Expected End:  06/25/25            4. Pt will report at CJ level (20-40% impaired) on FOTO knee to demonstrate increase in LE function with every day tasks.  (Progressing)       Start:  02/25/25    Expected End:  06/25/25               Mid Term Goals       1.Report decreased L  knee pain  < / =  1/10  to increase tolerance for ADLs (Met)       Start:  02/25/25    Expected End:  04/08/25    Resolved:  03/16/25         2. Increase knee ROM to symmetrical with R knee in order to be able  to perform ADLs without difficulty.  (Progressing)       Start:  02/25/25    Expected End:  04/08/25            3. Pt to tolerate HEP to improve ROM and independence with ADL's (Met)       Start:  02/25/25    Expected End:  04/08/25    Resolved:  03/18/25           Resolved       Short Term Goals       1. Display a SLR without a quad lag in the L knee (Met)       Start:  02/25/25    Expected End:  03/11/25    Resolved:  03/05/25         2. Display full L knee hyperextension symmetrical with the R knee. (Met)       Start:  02/25/25    Expected End:  03/11/25    Resolved:  03/05/25             Ambrose Rivas, PT, DPT

## 2025-04-07 DIAGNOSIS — Z76.89 ENCOUNTER TO ESTABLISH CARE: Primary | ICD-10-CM

## 2025-04-15 ENCOUNTER — CLINICAL SUPPORT (OUTPATIENT)
Dept: REHABILITATION | Facility: HOSPITAL | Age: 30
End: 2025-04-15
Payer: COMMERCIAL

## 2025-04-15 DIAGNOSIS — Z98.890 S/P LATERAL MENISCECTOMY OF LEFT KNEE: Primary | ICD-10-CM

## 2025-04-15 PROCEDURE — 97110 THERAPEUTIC EXERCISES: CPT

## 2025-04-15 PROCEDURE — 97112 NEUROMUSCULAR REEDUCATION: CPT

## 2025-04-15 PROCEDURE — 97530 THERAPEUTIC ACTIVITIES: CPT

## 2025-04-16 NOTE — PROGRESS NOTES
"  Outpatient Rehab    Physical Therapy Progress Note    Patient Name: Meet Gilliam  MRN: [id number]  YOB: 1995  Encounter Date: 4/15/2025    Therapy Diagnosis:   Encounter Diagnosis   Name Primary?    S/P lateral meniscectomy of left knee Yes     Physician: Isra Fox*    Physician Orders: Eval and Treat  Medical Diagnosis: Acute medial meniscus tear of left knee, initial encounter  Acute lateral meniscus tear of left knee, initial encounter    Visit # / Visits Authorized:  10 / 20  Insurance Authorization Period: 2/25/2025 to 12/31/2025  Date of Evaluation:  2/25/2025   Plan of Care Certification:  2/28/2025 to 6/30/2025      PT/PTA:     Number of PTA visits since last PT visit:   Time In: 1605   Time Out: 1700  Total Time: 55   Total Billable Time: 55    FOTO:  Intake Score: 29%  Survey Score 1:  %  Survey Score 2:  %         Subjective   Meet is doing good and isn't having any issues with his knee..         Objective           Treatment:  Therapeutic Exercise  TE 1: Treadmill 1.5 x 15 min  TE 2: Knee extension matrix 3x10 25#  TE 3: Hamstring curl 3x10 35#  Balance/Neuromuscular Re-Education  NMR 7: Bio feedback quad sets/SLR/standing TKE 5" ea  NMR 8: Single leg bridges 3x10  Therapeutic Activity  TA 1: Stationary lunges 3x10  TA 2: Sit to stands 3x10    Time Entry(in minutes):  Neuromuscular Re-Education Time Entry: 17  Therapeutic Activity Time Entry: 15  Therapeutic Exercise Time Entry: 23    Assessment & Plan   Assessment: Meet presented to therapy without symptoms. He demonstrated good squat form after cueing to shift weight onto left leg. He had appropriate level of fatigue at the end of session.       Patient will continue to benefit from skilled outpatient physical therapy to address the deficits listed in the problem list box on initial evaluation, provide pt/family education and to maximize pt's level of independence in the home and community environment.     Patient's " spiritual, cultural, and educational needs considered and patient agreeable to plan of care and goals.           Plan: Progress to no crutches in community. Continue focus on strengthening 2x per week for 6 weeks.    Goals:   Active       Long Term Goals       1.Report decreased L knee pain < / = 0/10  to increase tolerance for drag performances  (Met)       Start:  02/25/25    Expected End:  06/25/25    Resolved:  04/02/25         2.Patient goal: to return to drag performances (Progressing)       Start:  02/25/25    Expected End:  06/25/25            3.Increase strength to 90% LSI to increase tolerance for ADL and work activities. (Progressing)       Start:  02/25/25    Expected End:  06/25/25            4. Pt will report at CJ level (20-40% impaired) on FOTO knee to demonstrate increase in LE function with every day tasks.  (Progressing)       Start:  02/25/25    Expected End:  06/25/25               Mid Term Goals       1.Report decreased L  knee pain  < / =  1/10  to increase tolerance for ADLs (Met)       Start:  02/25/25    Expected End:  04/08/25    Resolved:  03/16/25         2. Increase knee ROM to symmetrical with R knee in order to be able to perform ADLs without difficulty.  (Progressing)       Start:  02/25/25    Expected End:  04/08/25            3. Pt to tolerate HEP to improve ROM and independence with ADL's (Met)       Start:  02/25/25    Expected End:  04/08/25    Resolved:  03/18/25           Resolved       Short Term Goals       1. Display a SLR without a quad lag in the L knee (Met)       Start:  02/25/25    Expected End:  03/11/25    Resolved:  03/05/25         2. Display full L knee hyperextension symmetrical with the R knee. (Met)       Start:  02/25/25    Expected End:  03/11/25    Resolved:  03/05/25             Joshua Marroquin, PT

## 2025-04-17 ENCOUNTER — CLINICAL SUPPORT (OUTPATIENT)
Dept: REHABILITATION | Facility: HOSPITAL | Age: 30
End: 2025-04-17
Attending: PHYSICAL THERAPIST
Payer: COMMERCIAL

## 2025-04-17 DIAGNOSIS — Z98.890 S/P LATERAL MENISCECTOMY OF LEFT KNEE: Primary | ICD-10-CM

## 2025-04-17 DIAGNOSIS — M25.669 DECREASED RANGE OF MOTION OF KNEE, UNSPECIFIED LATERALITY: ICD-10-CM

## 2025-04-17 DIAGNOSIS — M62.81 DECREASED MUSCLE STRENGTH: ICD-10-CM

## 2025-04-17 PROCEDURE — 97530 THERAPEUTIC ACTIVITIES: CPT | Performed by: PHYSICAL THERAPIST

## 2025-04-17 PROCEDURE — 97112 NEUROMUSCULAR REEDUCATION: CPT | Performed by: PHYSICAL THERAPIST

## 2025-04-17 PROCEDURE — 97110 THERAPEUTIC EXERCISES: CPT | Performed by: PHYSICAL THERAPIST

## 2025-04-20 PROBLEM — M25.669 DECREASED RANGE OF MOTION (ROM) OF KNEE: Status: ACTIVE | Noted: 2025-04-20

## 2025-04-20 PROBLEM — M62.81 DECREASED MUSCLE STRENGTH: Status: ACTIVE | Noted: 2025-04-20

## 2025-04-20 NOTE — PROGRESS NOTES
"  Outpatient Rehab    Physical Therapy Progress Note    Patient Name: Meet Gilliam  MRN: [id number]  YOB: 1995  Encounter Date: 4/17/2025    Therapy Diagnosis:   Encounter Diagnoses   Name Primary?    S/P lateral meniscectomy of left knee Yes    Decreased muscle strength     Decreased range of motion of knee, unspecified laterality      Physician: Isra Fox*    Physician Orders: Eval and Treat  Medical Diagnosis: Acute medial meniscus tear of left knee, initial encounter  Acute lateral meniscus tear of left knee, initial encounter    Visit # / Visits Authorized:  11 / 20  Insurance Authorization Period: 2/25/2025 to 12/31/2025  Date of Evaluation:  2/25/2025   Plan of Care Certification:  2/28/2025 to 6/30/2025      PT/PTA:     Number of PTA visits since last PT visit:   Time In: 1607   Time Out: 1500  Total Time: 1373   Total Billable Time: 45    FOTO:  Intake Score:  %  Survey Score 1:  %  Survey Score 2:  %    Precautions     POSTOPERATIVE PLAN: Patient we will remain toe-touch weight-bearing until at least 6 weeks out from his date of injury.  On March 10, the plan would be to begin gradual progressive weight-bearing to tolerance and come off the crutches by 8 weeks post-injury. Full range of motion to tolerance.      Subjective   Meet is doing good and isn't having any issues with his knee. Was able to go to [place] and walk around with minmal pain. Even did some dancing. Does report some mid foot pain which has increased since surgery though he has a chronic history of this..  Pain reported as 0/10.      Objective           Treatment:  Therapeutic Exercise  TE 2: Knee extension matrix 3x10 25#  TE 3: Hamstring curl 3x10 35#  Balance/Neuromuscular Re-Education  NMR 1: SLR 3x15  NMR 2: DL heel raise iso 10 x 10"  NMR 4: Lateral hip ABD walks GTB 3 x burn  Therapeutic Activity  TA 1: Stationary lunges 3x10  TA 2: 6" Step up 3 x 8  TA 3: 4" step down with HHA, 3 x 8 (mid foot " pain)    Time Entry(in minutes):  Neuromuscular Re-Education Time Entry: 25  Therapeutic Activity Time Entry: 10  Therapeutic Exercise Time Entry: 10    Assessment & Plan   Assessment: Overall is doing well. Now that we are moving into loading he is showing mid foot pain. He has a chronic histiry of this which is likely increased by the recent unloading. WOuld benefit from assessment to foot ankle, suspect posterior tib is pain generator as his symptoms reduce when we correct with hip cuing. Able to progress into functional strengthening exercises.  Evaluation/Treatment Tolerance: Patient tolerated treatment well    Patient will continue to benefit from skilled outpatient physical therapy to address the deficits listed in the problem list box on initial evaluation, provide pt/family education and to maximize pt's level of independence in the home and community environment.     Patient's spiritual, cultural, and educational needs considered and patient agreeable to plan of care and goals.           Plan: Progress to no crutches in community. Continue focus on strengthening 2x per week for 6 weeks. Assess foot/ankle as needed and establish goals.    Goals:   Active       Long Term Goals       1.Report decreased L knee pain < / = 0/10  to increase tolerance for drag performances  (Met)       Start:  02/25/25    Expected End:  06/25/25    Resolved:  04/02/25         2.Patient goal: to return to drag performances (Progressing)       Start:  02/25/25    Expected End:  06/25/25            3.Increase strength to 90% LSI to increase tolerance for ADL and work activities. (Progressing)       Start:  02/25/25    Expected End:  06/25/25            4. Pt will report at CJ level (20-40% impaired) on FOTO knee to demonstrate increase in LE function with every day tasks.  (Progressing)       Start:  02/25/25    Expected End:  06/25/25              Resolved       Mid Term Goals       1.Report decreased L  knee pain  < / =  1/10  to  increase tolerance for ADLs (Met)       Start:  02/25/25    Expected End:  04/08/25    Resolved:  03/16/25         2. Increase knee ROM to symmetrical with R knee in order to be able to perform ADLs without difficulty.  (Met)       Start:  02/25/25    Expected End:  04/08/25    Resolved:  04/20/25         3. Pt to tolerate HEP to improve ROM and independence with ADL's (Met)       Start:  02/25/25    Expected End:  04/08/25    Resolved:  03/18/25            Short Term Goals       1. Display a SLR without a quad lag in the L knee (Met)       Start:  02/25/25    Expected End:  03/11/25    Resolved:  03/05/25         2. Display full L knee hyperextension symmetrical with the R knee. (Met)       Start:  02/25/25    Expected End:  03/11/25    Resolved:  03/05/25             Ambrose Rivas, PT, DPT

## 2025-04-22 ENCOUNTER — CLINICAL SUPPORT (OUTPATIENT)
Dept: REHABILITATION | Facility: HOSPITAL | Age: 30
End: 2025-04-22
Payer: COMMERCIAL

## 2025-04-22 DIAGNOSIS — M62.81 DECREASED MUSCLE STRENGTH: ICD-10-CM

## 2025-04-22 DIAGNOSIS — Z98.890 S/P LATERAL MENISCECTOMY OF LEFT KNEE: Primary | ICD-10-CM

## 2025-04-22 DIAGNOSIS — M25.669 DECREASED RANGE OF MOTION OF KNEE, UNSPECIFIED LATERALITY: ICD-10-CM

## 2025-04-22 PROCEDURE — 97530 THERAPEUTIC ACTIVITIES: CPT

## 2025-04-22 PROCEDURE — 97110 THERAPEUTIC EXERCISES: CPT

## 2025-04-24 ENCOUNTER — CLINICAL SUPPORT (OUTPATIENT)
Dept: REHABILITATION | Facility: HOSPITAL | Age: 30
End: 2025-04-24
Payer: COMMERCIAL

## 2025-04-24 DIAGNOSIS — M25.662 DECREASED RANGE OF MOTION OF LEFT KNEE: ICD-10-CM

## 2025-04-24 DIAGNOSIS — M62.81 DECREASED MUSCLE STRENGTH: ICD-10-CM

## 2025-04-24 DIAGNOSIS — Z98.890 S/P LATERAL MENISCECTOMY OF LEFT KNEE: Primary | ICD-10-CM

## 2025-04-24 PROCEDURE — 97530 THERAPEUTIC ACTIVITIES: CPT

## 2025-04-24 PROCEDURE — 97112 NEUROMUSCULAR REEDUCATION: CPT

## 2025-04-24 PROCEDURE — 97110 THERAPEUTIC EXERCISES: CPT

## 2025-04-27 NOTE — PROGRESS NOTES
"  Outpatient Rehab    Physical Therapy Progress Note    Patient Name: Meet Gilliam  MRN: [id number]  YOB: 1995  Encounter Date: 4/22/2025    Therapy Diagnosis:   Encounter Diagnoses   Name Primary?    S/P lateral meniscectomy of left knee Yes    Decreased muscle strength     Decreased range of motion of knee, unspecified laterality      Physician: Isra Fox*    Physician Orders: Eval and Treat  Medical Diagnosis: Acute medial meniscus tear of left knee, initial encounter  Acute lateral meniscus tear of left knee, initial encounter    Visit # / Visits Authorized:  13 / 20  Insurance Authorization Period: 2/25/2025 to 12/31/2025  Date of Evaluation:  2/25/2025   Plan of Care Certification:  2/28/2025 to 6/30/2025      PT/PTA:     Number of PTA visits since last PT visit:   Time In: 1607   Time Out: 1700  Total Time: 53   Total Billable Time: 53    FOTO:  Intake Score:  %  Survey Score 1:  %  Survey Score 2:  %         Subjective   Meet reports that his knee continues to feel like it is improving..         Objective           Treatment:  PT extender available to assist with treatment as needed    Therapeutic Exercise  TE 1: Treadmill 1.5 x 15 min  Balance/Neuromuscular Re-Education  NMR 1: Short foot x4 min  Therapeutic Activity  TA 2: 6" Step up 4 x 8  TA 3: 4" step down 4 x 8    Time Entry(in minutes):  Neuromuscular Re-Education Time Entry: 4  Therapeutic Activity Time Entry: 34  Therapeutic Exercise Time Entry: 15    Assessment & Plan   Assessment: Meet presented to therapy without symptoms. He demonstrated ability to activate foot intrinsics while in sitting. Cueing to maintain that contraction during single leg activites decreased reported foot pain. However, his foot intrinsics fatigued quickly.       Patient will continue to benefit from skilled outpatient physical therapy to address the deficits listed in the problem list box on initial evaluation, provide pt/family education " and to maximize pt's level of independence in the home and community environment.     Patient's spiritual, cultural, and educational needs considered and patient agreeable to plan of care and goals.           Plan: Continue to strengthen left lower extremity. Practice utilizing foot intrinsics with single leg activities.    Goals:   Active       Long Term Goals       1.Report decreased L knee pain < / = 0/10  to increase tolerance for drag performances  (Met)       Start:  02/25/25    Expected End:  06/25/25    Resolved:  04/02/25         2.Patient goal: to return to drag performances (Progressing)       Start:  02/25/25    Expected End:  06/25/25            3.Increase strength to 90% LSI to increase tolerance for ADL and work activities. (Progressing)       Start:  02/25/25    Expected End:  06/25/25            4. Pt will report at CJ level (20-40% impaired) on FOTO knee to demonstrate increase in LE function with every day tasks.  (Progressing)       Start:  02/25/25    Expected End:  06/25/25              Resolved       Mid Term Goals       1.Report decreased L  knee pain  < / =  1/10  to increase tolerance for ADLs (Met)       Start:  02/25/25    Expected End:  04/08/25    Resolved:  03/16/25         2. Increase knee ROM to symmetrical with R knee in order to be able to perform ADLs without difficulty.  (Met)       Start:  02/25/25    Expected End:  04/08/25    Resolved:  04/20/25         3. Pt to tolerate HEP to improve ROM and independence with ADL's (Met)       Start:  02/25/25    Expected End:  04/08/25    Resolved:  03/18/25            Short Term Goals       1. Display a SLR without a quad lag in the L knee (Met)       Start:  02/25/25    Expected End:  03/11/25    Resolved:  03/05/25         2. Display full L knee hyperextension symmetrical with the R knee. (Met)       Start:  02/25/25    Expected End:  03/11/25    Resolved:  03/05/25             Joshua Marroquin PT

## 2025-04-28 NOTE — PROGRESS NOTES
"  Outpatient Rehab    Physical Therapy Progress Note    Patient Name: Meet Gilliam  MRN: [id number]  YOB: 1995  Encounter Date: 4/24/2025    Therapy Diagnosis:   Encounter Diagnoses   Name Primary?    S/P lateral meniscectomy of left knee Yes    Decreased muscle strength     Decreased range of motion of left knee      Physician: Isra Fox*    Physician Orders: Eval and Treat  Medical Diagnosis: Acute medial meniscus tear of left knee, initial encounter  Acute lateral meniscus tear of left knee, initial encounter    Visit # / Visits Authorized:  13 / 20  Insurance Authorization Period: 2/25/2025 to 12/31/2025  Date of Evaluation:  2/25/2025   Plan of Care Certification:  2/28/2025 to 6/30/2025      PT/PTA:     Number of PTA visits since last PT visit:   Time In: 1600   Time Out: 1700  Total Time: 60   Total Billable Time: 60    FOTO:  Intake Score: 29%  Survey Score 1:  %  Survey Score 2:  %         Subjective   Meet reports that his foot pain has decreased since doing the foot intrinsic exercise..         Objective           Treatment:  PT extender available to assist with treatment as needed    Therapeutic Exercise  TE 1: Treadmill 1.5 x 10 min  TE 2: Knee extension matrix 3x10 25#  TE 3: Hamstring curl 3x10 35#  Balance/Neuromuscular Re-Education  NMR 1: Short foot x4 min  NMR 2: Posterior tib heel raise 3x15  Therapeutic Activity  TA 1: Lateral lunges 3x10 bilateral  TA 2: 6" Step up 4 x 8  TA 3: 4" step down 4 x 8  TA 4: Goblet squat 25# 4x8    Time Entry(in minutes):  Neuromuscular Re-Education Time Entry: 8  Therapeutic Activity Time Entry: 34  Therapeutic Exercise Time Entry: 18    Assessment & Plan   Assessment: Meet presented to therapy with less foot pain. He was able to tolerate increase in repetitions in single leg activities without the presence of pronation. He required mild verbal cueing to prevent weight shift off surgical lower extremity during goblet squats. " Overall, progressing well.       Patient will continue to benefit from skilled outpatient physical therapy to address the deficits listed in the problem list box on initial evaluation, provide pt/family education and to maximize pt's level of independence in the home and community environment.     Patient's spiritual, cultural, and educational needs considered and patient agreeable to plan of care and goals.           Plan: Continue to strengthen left lower extremity. Practice utilizing foot intrinsics with single leg activities.    Goals:   Active       Long Term Goals       1.Report decreased L knee pain < / = 0/10  to increase tolerance for drag performances  (Met)       Start:  02/25/25    Expected End:  06/25/25    Resolved:  04/02/25         2.Patient goal: to return to drag performances (Progressing)       Start:  02/25/25    Expected End:  06/25/25            3.Increase strength to 90% LSI to increase tolerance for ADL and work activities. (Progressing)       Start:  02/25/25    Expected End:  06/25/25            4. Pt will report at CJ level (20-40% impaired) on FOTO knee to demonstrate increase in LE function with every day tasks.  (Progressing)       Start:  02/25/25    Expected End:  06/25/25              Resolved       Mid Term Goals       1.Report decreased L  knee pain  < / =  1/10  to increase tolerance for ADLs (Met)       Start:  02/25/25    Expected End:  04/08/25    Resolved:  03/16/25         2. Increase knee ROM to symmetrical with R knee in order to be able to perform ADLs without difficulty.  (Met)       Start:  02/25/25    Expected End:  04/08/25    Resolved:  04/20/25         3. Pt to tolerate HEP to improve ROM and independence with ADL's (Met)       Start:  02/25/25    Expected End:  04/08/25    Resolved:  03/18/25            Short Term Goals       1. Display a SLR without a quad lag in the L knee (Met)       Start:  02/25/25    Expected End:  03/11/25    Resolved:  03/05/25         2.  Display full L knee hyperextension symmetrical with the R knee. (Met)       Start:  02/25/25    Expected End:  03/11/25    Resolved:  03/05/25             Joshua Marroquin PT

## 2025-04-29 ENCOUNTER — CLINICAL SUPPORT (OUTPATIENT)
Dept: REHABILITATION | Facility: HOSPITAL | Age: 30
End: 2025-04-29
Payer: COMMERCIAL

## 2025-04-29 DIAGNOSIS — M62.81 DECREASED MUSCLE STRENGTH: ICD-10-CM

## 2025-04-29 DIAGNOSIS — Z98.890 S/P LATERAL MENISCECTOMY OF LEFT KNEE: Primary | ICD-10-CM

## 2025-04-29 DIAGNOSIS — M25.662 DECREASED RANGE OF MOTION OF LEFT KNEE: ICD-10-CM

## 2025-04-29 PROCEDURE — 97110 THERAPEUTIC EXERCISES: CPT

## 2025-04-29 PROCEDURE — 97530 THERAPEUTIC ACTIVITIES: CPT

## 2025-04-30 NOTE — PROGRESS NOTES
"  Outpatient Rehab    Physical Therapy Progress Note    Patient Name: Meet Gilliam  MRN: [id number]  YOB: 1995  Encounter Date: 4/29/2025    Therapy Diagnosis:   Encounter Diagnoses   Name Primary?    S/P lateral meniscectomy of left knee Yes    Decreased muscle strength     Decreased range of motion of left knee      Physician: Isra Fox*    Physician Orders: Eval and Treat  Medical Diagnosis: Acute medial meniscus tear of left knee, initial encounter  Acute lateral meniscus tear of left knee, initial encounter    Visit # / Visits Authorized:  14 / 20  Insurance Authorization Period: 2/25/2025 to 12/31/2025  Date of Evaluation:  2/25/2025   Plan of Care Certification:  2/28/2025 to 6/30/2025      PT/PTA:     Number of PTA visits since last PT visit:   Time In: 1610   Time Out: 1700  Total Time: 50   Total Billable Time: 50    FOTO:  Intake Score: 29%  Survey Score 1: 74%  Survey Score 2:  %         Subjective   Meet reports that he is doing well and didn't have any significant soreness after last visit..         Objective           Treatment:  PT extender available to assist with treatment as needed    Therapeutic Exercise  TE 1: Elliptical 1.5 x 8 min  TE 2: Knee extension matrix 3x15 35#  TE 3: Hamstring curl 3x15 45#  Therapeutic Activity  TA 2: 6" Step up 4 x 8  TA 3: 4" step down 4 x 8  TA 4: Goblet squat 30# 4x8 20" box  TA 5: RDL 10# 3x10    Time Entry(in minutes):  Therapeutic Activity Time Entry: 30  Therapeutic Exercise Time Entry: 20    Assessment & Plan   Assessment: Meet reported mild anterior knee pain during last minute of elliptical, which subsided upon stopping exercise. He demonstrated increased foot pain with muscular fatigue and inability to maintain a rigid arch during single leg exercises. He had a tendency to flex from lumbar spine during RDL, but improved with verbal cueing on when reaching end range of hip flexion.       Patient will continue to benefit " from skilled outpatient physical therapy to address the deficits listed in the problem list box on initial evaluation, provide pt/family education and to maximize pt's level of independence in the home and community environment.     Patient's spiritual, cultural, and educational needs considered and patient agreeable to plan of care and goals.           Plan: Continue to strengthen left lower extremity. Practice utilizing foot intrinsics with single leg activities.    Goals:   Active       Long Term Goals       1.Report decreased L knee pain < / = 0/10  to increase tolerance for drag performances  (Met)       Start:  02/25/25    Expected End:  06/25/25    Resolved:  04/02/25         2.Patient goal: to return to drag performances (Progressing)       Start:  02/25/25    Expected End:  06/25/25            3.Increase strength to 90% LSI to increase tolerance for ADL and work activities. (Progressing)       Start:  02/25/25    Expected End:  06/25/25            4. Pt will report at CJ level (20-40% impaired) on FOTO knee to demonstrate increase in LE function with every day tasks.  (Progressing)       Start:  02/25/25    Expected End:  06/25/25              Resolved       Mid Term Goals       1.Report decreased L  knee pain  < / =  1/10  to increase tolerance for ADLs (Met)       Start:  02/25/25    Expected End:  04/08/25    Resolved:  03/16/25         2. Increase knee ROM to symmetrical with R knee in order to be able to perform ADLs without difficulty.  (Met)       Start:  02/25/25    Expected End:  04/08/25    Resolved:  04/20/25         3. Pt to tolerate HEP to improve ROM and independence with ADL's (Met)       Start:  02/25/25    Expected End:  04/08/25    Resolved:  03/18/25            Short Term Goals       1. Display a SLR without a quad lag in the L knee (Met)       Start:  02/25/25    Expected End:  03/11/25    Resolved:  03/05/25         2. Display full L knee hyperextension symmetrical with the R knee.  (Met)       Start:  02/25/25    Expected End:  03/11/25    Resolved:  03/05/25             Joshua Marroquin PT

## 2025-05-01 ENCOUNTER — CLINICAL SUPPORT (OUTPATIENT)
Dept: REHABILITATION | Facility: HOSPITAL | Age: 30
End: 2025-05-01
Payer: COMMERCIAL

## 2025-05-01 DIAGNOSIS — M25.662 DECREASED RANGE OF MOTION OF LEFT KNEE: ICD-10-CM

## 2025-05-01 DIAGNOSIS — Z98.890 S/P LATERAL MENISCECTOMY OF LEFT KNEE: Primary | ICD-10-CM

## 2025-05-01 DIAGNOSIS — M62.81 DECREASED MUSCLE STRENGTH: ICD-10-CM

## 2025-05-01 PROCEDURE — 97110 THERAPEUTIC EXERCISES: CPT | Performed by: PHYSICAL THERAPIST

## 2025-05-01 PROCEDURE — 97530 THERAPEUTIC ACTIVITIES: CPT | Performed by: PHYSICAL THERAPIST

## 2025-05-01 PROCEDURE — 97112 NEUROMUSCULAR REEDUCATION: CPT | Performed by: PHYSICAL THERAPIST

## 2025-05-02 NOTE — PROGRESS NOTES
"    Outpatient Rehab    Physical Therapy Progress Note    Patient Name: Meet Gilliam  MRN: [id number]  YOB: 1995  Encounter Date: 5/1/2025    Therapy Diagnosis:   Encounter Diagnoses   Name Primary?    S/P lateral meniscectomy of left knee Yes    Decreased muscle strength     Decreased range of motion of left knee      Physician: Isra Fox*    Physician Orders: Eval and Treat  Medical Diagnosis: Acute medial meniscus tear of left knee, initial encounter  Acute lateral meniscus tear of left knee, initial encounter    Visit # / Visits Authorized:  15 / 20  Insurance Authorization Period: 2/25/2025 to 12/31/2025  Date of Evaluation:  2/25/2025   Plan of Care Certification:  2/28/2025 to 6/30/2025      PT/PTA:     Number of PTA visits since last PT visit:   Time In: 1602   Time Out: 1703  Total Time: 61   Total Billable Time: 45    FOTO:  Intake Score: 29%  Survey Score 1: 74%  Survey Score 2:  %         Subjective   Most issues are with the mid foot pain. No knee pain..         Objective           Treatment:  PT extender available to assist with treatment as needed    Therapeutic Exercise  TE 1: Elliptical 1.5 x 8 min  TE 2: Knee extension matrix 3x15 35#  TE 3: Hamstring curl 3x15 45#  Balance/Neuromuscular Re-Education  NMR 2: Posterior tib heel raise 3x15  NMR 6: Rearfoot elevated bridge SL 5" x 20 ea  NMR 9: Modified clam plank 10 x 10"  Therapeutic Activity  TA 2: 6" Step up 4 x 8  TA 3: 6" step down 4 x 8    Time Entry(in minutes):  Neuromuscular Re-Education Time Entry: 10  Therapeutic Activity Time Entry: 25  Therapeutic Exercise Time Entry: 10    Assessment & Plan   Assessment: No pain with ellipitcal today. Did have some singificant symptoms with step down. Overall strength is improving. Should focus on functional strengthening to achieve his personal goals.  Evaluation/Treatment Tolerance: Patient tolerated treatment well    Patient will continue to benefit from skilled outpatient " physical therapy to address the deficits listed in the problem list box on initial evaluation, provide pt/family education and to maximize pt's level of independence in the home and community environment.     Patient's spiritual, cultural, and educational needs considered and patient agreeable to plan of care and goals.           Plan: Continue to strengthen left lower extremity. Practice utilizing foot intrinsics with single leg activities.    Goals:   Active       Long Term Goals       1.Report decreased L knee pain < / = 0/10  to increase tolerance for drag performances  (Met)       Start:  02/25/25    Expected End:  06/25/25    Resolved:  04/02/25         2.Patient goal: to return to drag performances (Progressing)       Start:  02/25/25    Expected End:  06/25/25            3.Increase strength to 90% LSI to increase tolerance for ADL and work activities. (Progressing)       Start:  02/25/25    Expected End:  06/25/25            4. Pt will report at CJ level (20-40% impaired) on FOTO knee to demonstrate increase in LE function with every day tasks.  (Progressing)       Start:  02/25/25    Expected End:  06/25/25              Resolved       Mid Term Goals       1.Report decreased L  knee pain  < / =  1/10  to increase tolerance for ADLs (Met)       Start:  02/25/25    Expected End:  04/08/25    Resolved:  03/16/25         2. Increase knee ROM to symmetrical with R knee in order to be able to perform ADLs without difficulty.  (Met)       Start:  02/25/25    Expected End:  04/08/25    Resolved:  04/20/25         3. Pt to tolerate HEP to improve ROM and independence with ADL's (Met)       Start:  02/25/25    Expected End:  04/08/25    Resolved:  03/18/25            Short Term Goals       1. Display a SLR without a quad lag in the L knee (Met)       Start:  02/25/25    Expected End:  03/11/25    Resolved:  03/05/25         2. Display full L knee hyperextension symmetrical with the R knee. (Met)       Start:  02/25/25     Expected End:  03/11/25    Resolved:  03/05/25             Ambrose Rivas, PT, DPT

## 2025-05-06 ENCOUNTER — CLINICAL SUPPORT (OUTPATIENT)
Dept: REHABILITATION | Facility: HOSPITAL | Age: 30
End: 2025-05-06
Payer: COMMERCIAL

## 2025-05-06 DIAGNOSIS — M25.662 DECREASED RANGE OF MOTION OF LEFT KNEE: ICD-10-CM

## 2025-05-06 DIAGNOSIS — M62.81 DECREASED MUSCLE STRENGTH: ICD-10-CM

## 2025-05-06 DIAGNOSIS — Z98.890 S/P LATERAL MENISCECTOMY OF LEFT KNEE: Primary | ICD-10-CM

## 2025-05-06 PROCEDURE — 97110 THERAPEUTIC EXERCISES: CPT

## 2025-05-06 PROCEDURE — 97530 THERAPEUTIC ACTIVITIES: CPT

## 2025-05-06 PROCEDURE — 97140 MANUAL THERAPY 1/> REGIONS: CPT

## 2025-05-06 NOTE — PROGRESS NOTES
"    Outpatient Rehab    Physical Therapy Progress Note    Patient Name: Meet Gilliam  MRN: [id number]  YOB: 1995  Encounter Date: 5/6/2025    Therapy Diagnosis:   Encounter Diagnoses   Name Primary?    S/P lateral meniscectomy of left knee Yes    Decreased muscle strength     Decreased range of motion of left knee      Physician: Isra Fox*    Physician Orders: Eval and Treat  Medical Diagnosis: Acute medial meniscus tear of left knee, initial encounter  Acute lateral meniscus tear of left knee, initial encounter    Visit # / Visits Authorized:  16 / 20  Insurance Authorization Period: 2/25/2025 to 12/31/2025  Date of Evaluation:  2/25/2025   Plan of Care Certification:  2/28/2025 to 6/30/2025      PT/PTA:     Number of PTA visits since last PT visit:   Time In: 1604   Time Out: 1700  Total Time: 56   Total Billable Time: 56    FOTO:  Intake Score: 29%  Survey Score 1: 74%  Survey Score 2: 99%         Subjective   Meet reports that his left knee continues to do really well and actually feels like it is doing better than his right knee..         Objective           Treatment:  PT extender available to assist with treatment as needed    Therapeutic Exercise  TE 1: Elliptical 1.5 x 8 min  Manual Therapy  MT 1: Knee assessment  Balance/Neuromuscular Re-Education  NMR 1: Short foot x4 min  Therapeutic Activity  TA 2: 12" step up 4x8 bilateral  TA 3: 6" step down 4 x 8 bilateral  TA 4: Goblet squat 35# 4x8 20" box  TA 5: RDL 10# 4x10 (2 sets with dowel and 2 sets with 10#)    Time Entry(in minutes):  Manual Therapy Time Entry: 8  Neuromuscular Re-Education Time Entry: 4  Therapeutic Activity Time Entry: 36  Therapeutic Exercise Time Entry: 8    Assessment & Plan   Assessment: Meet had decreased symptoms with dtep downs today, but notable quad fatigue with last set. He is improving his RDL with dowel to mainatin 3 points of contact, but continues to flex through lumbar and increase knee " flexion when adding light resistance.       Patient will continue to benefit from skilled outpatient physical therapy to address the deficits listed in the problem list box on initial evaluation, provide pt/family education and to maximize pt's level of independence in the home and community environment.     Patient's spiritual, cultural, and educational needs considered and patient agreeable to plan of care and goals.           Plan: Continue to strengthen left lower extremity. Practice utilizing foot intrinsics with single leg activities.    Goals:   Active       Long Term Goals       1.Report decreased L knee pain < / = 0/10  to increase tolerance for drag performances  (Met)       Start:  02/25/25    Expected End:  06/25/25    Resolved:  04/02/25         2.Patient goal: to return to drag performances (Progressing)       Start:  02/25/25    Expected End:  06/25/25            3.Increase strength to 90% LSI to increase tolerance for ADL and work activities. (Progressing)       Start:  02/25/25    Expected End:  06/25/25            4. Pt will report at CJ level (20-40% impaired) on FOTO knee to demonstrate increase in LE function with every day tasks.  (Met)       Start:  02/25/25    Expected End:  06/25/25    Resolved:  05/07/25           Resolved       Mid Term Goals       1.Report decreased L  knee pain  < / =  1/10  to increase tolerance for ADLs (Met)       Start:  02/25/25    Expected End:  04/08/25    Resolved:  03/16/25         2. Increase knee ROM to symmetrical with R knee in order to be able to perform ADLs without difficulty.  (Met)       Start:  02/25/25    Expected End:  04/08/25    Resolved:  04/20/25         3. Pt to tolerate HEP to improve ROM and independence with ADL's (Met)       Start:  02/25/25    Expected End:  04/08/25    Resolved:  03/18/25            Short Term Goals       1. Display a SLR without a quad lag in the L knee (Met)       Start:  02/25/25    Expected End:  03/11/25    Resolved:   03/05/25         2. Display full L knee hyperextension symmetrical with the R knee. (Met)       Start:  02/25/25    Expected End:  03/11/25    Resolved:  03/05/25             Joshua Marroquin PT

## 2025-05-08 ENCOUNTER — CLINICAL SUPPORT (OUTPATIENT)
Dept: REHABILITATION | Facility: HOSPITAL | Age: 30
End: 2025-05-08
Payer: COMMERCIAL

## 2025-05-08 DIAGNOSIS — M62.81 DECREASED MUSCLE STRENGTH: ICD-10-CM

## 2025-05-08 DIAGNOSIS — M25.662 DECREASED RANGE OF MOTION OF LEFT KNEE: ICD-10-CM

## 2025-05-08 DIAGNOSIS — Z98.890 S/P LATERAL MENISCECTOMY OF LEFT KNEE: Primary | ICD-10-CM

## 2025-05-08 PROCEDURE — 97530 THERAPEUTIC ACTIVITIES: CPT | Performed by: PHYSICAL THERAPIST

## 2025-05-08 PROCEDURE — 97110 THERAPEUTIC EXERCISES: CPT | Performed by: PHYSICAL THERAPIST

## 2025-05-08 PROCEDURE — 97140 MANUAL THERAPY 1/> REGIONS: CPT | Performed by: PHYSICAL THERAPIST

## 2025-05-11 NOTE — PROGRESS NOTES
"    Outpatient Rehab    Physical Therapy Progress Note    Patient Name: Meet Gilliam  MRN: [id number]  YOB: 1995  Encounter Date: 5/8/2025    Therapy Diagnosis:   Encounter Diagnoses   Name Primary?    S/P lateral meniscectomy of left knee Yes    Decreased muscle strength     Decreased range of motion of left knee      Physician: Isra Fox*    Physician Orders: Eval and Treat  Medical Diagnosis: Acute medial meniscus tear of left knee, initial encounter  Acute lateral meniscus tear of left knee, initial encounter    Visit # / Visits Authorized:  17 / 20  Insurance Authorization Period: 2/25/2025 to 12/31/2025  Date of Evaluation:  2/25/2025   Plan of Care Certification:  2/28/2025 to 6/30/2025      PT/PTA:     Number of PTA visits since last PT visit:   Time In: 1602   Time Out: 1710  Total Time: 68   Total Billable Time: 40    FOTO:  Intake Score: 29%  Survey Score 1: 74%  Survey Score 2: 99%         Subjective   No pain. Able to work on his HEP. Some ankle/mid foot pain..  Pain reported as 0/10.      Objective           Treatment:  PT extender available to assist with treatment as needed    Therapeutic Exercise  TE 1: Elliptical 1.5 x 8 min  Manual Therapy  MT 1: Knee assessment  MT 2: Ankle talocrural manipulation bilateral  MT 3: Posterior talar glides LLE, IV  Balance/Neuromuscular Re-Education  NMR 8: Single leg bridges 3x10  NMR 9: Modified clam plank 10 x 10"  NMR 10: SL heel raise iso 10 x 10"  Therapeutic Activity  TA 2: 12" step up 4x8 bilateral  TA 3: 6" step down 4 x 8 bilateral    Time Entry(in minutes):  Manual Therapy Time Entry: 8  Therapeutic Activity Time Entry: 24  Therapeutic Exercise Time Entry: 8    Assessment & Plan   Assessment: Continues to progress well. More limited by previous foot/ankle dysfunction at this point. DF is very poor, possible long standing soft tissue component. May be ready for DC to [facility] soon.  Evaluation/Treatment Tolerance: Patient tolerated " treatment well    Patient will continue to benefit from skilled outpatient physical therapy to address the deficits listed in the problem list box on initial evaluation, provide pt/family education and to maximize pt's level of independence in the home and community environment.     Patient's spiritual, cultural, and educational needs considered and patient agreeable to plan of care and goals.           Plan: Continue to strengthen left lower extremity. Practice utilizing foot intrinsics with single leg activities.    Goals:   Active       Long Term Goals       1.Report decreased L knee pain < / = 0/10  to increase tolerance for drag performances  (Met)       Start:  02/25/25    Expected End:  06/25/25    Resolved:  04/02/25         2.Patient goal: to return to drag performances (Progressing)       Start:  02/25/25    Expected End:  06/25/25            3.Increase strength to 90% LSI to increase tolerance for ADL and work activities. (Progressing)       Start:  02/25/25    Expected End:  06/25/25            4. Pt will report at CJ level (20-40% impaired) on FOTO knee to demonstrate increase in LE function with every day tasks.  (Met)       Start:  02/25/25    Expected End:  06/25/25    Resolved:  05/07/25           Resolved       Mid Term Goals       1.Report decreased L  knee pain  < / =  1/10  to increase tolerance for ADLs (Met)       Start:  02/25/25    Expected End:  04/08/25    Resolved:  03/16/25         2. Increase knee ROM to symmetrical with R knee in order to be able to perform ADLs without difficulty.  (Met)       Start:  02/25/25    Expected End:  04/08/25    Resolved:  04/20/25         3. Pt to tolerate HEP to improve ROM and independence with ADL's (Met)       Start:  02/25/25    Expected End:  04/08/25    Resolved:  03/18/25            Short Term Goals       1. Display a SLR without a quad lag in the L knee (Met)       Start:  02/25/25    Expected End:  03/11/25    Resolved:  03/05/25         2.  Display full L knee hyperextension symmetrical with the R knee. (Met)       Start:  02/25/25    Expected End:  03/11/25    Resolved:  03/05/25             Ambrose Rivas PT, DONALDT

## 2025-05-13 ENCOUNTER — CLINICAL SUPPORT (OUTPATIENT)
Dept: REHABILITATION | Facility: HOSPITAL | Age: 30
End: 2025-05-13
Payer: COMMERCIAL

## 2025-05-13 DIAGNOSIS — M25.662 DECREASED RANGE OF MOTION OF LEFT KNEE: ICD-10-CM

## 2025-05-13 DIAGNOSIS — M62.81 DECREASED MUSCLE STRENGTH: ICD-10-CM

## 2025-05-13 DIAGNOSIS — Z98.890 S/P LATERAL MENISCECTOMY OF LEFT KNEE: Primary | ICD-10-CM

## 2025-05-13 PROCEDURE — 97110 THERAPEUTIC EXERCISES: CPT

## 2025-05-13 PROCEDURE — 97530 THERAPEUTIC ACTIVITIES: CPT

## 2025-05-13 NOTE — PROGRESS NOTES
"    Outpatient Rehab    Physical Therapy Progress Note    Patient Name: Meet Gilliam  MRN: [id number]  YOB: 1995  Encounter Date: 5/13/2025    Therapy Diagnosis:   Encounter Diagnoses   Name Primary?    S/P lateral meniscectomy of left knee Yes    Decreased muscle strength     Decreased range of motion of left knee      Physician: Isra Fox*    Physician Orders: Eval and Treat  Medical Diagnosis: Acute medial meniscus tear of left knee, initial encounter  Acute lateral meniscus tear of left knee, initial encounter    Visit # / Visits Authorized:  18 / 20  Insurance Authorization Period: 2/25/2025 to 12/31/2025  Date of Evaluation:  2/25/2025   Plan of Care Certification:  2/28/2025 to 6/30/2025      PT/PTA:     Number of PTA visits since last PT visit:   Time In: 1602   Time Out: 1710  Total Time: 68   Total Billable Time: 68    FOTO:  Intake Score: 29%  Survey Score 1: 74%  Survey Score 2: 99%         Subjective   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         Objective           Treatment:  PT extender available to assist with treatment as needed    Therapeutic Exercise  TE 1: Elliptical 1.5 x 8 min  TE 2: Knee extension matrix 3x15 35#  TE 3: Hamstring curl 3x15 45#  Manual Therapy  MT 1: Knee assessment  Therapeutic Activity  TA 1: Lateral lunges 3x10 bilateral  TA 2: 12" step up 4x8 bilateral  TA 3: 6" step down 4 x 8 bilateral  TA 4: Goblet squat 35# 4x8 20" box  TA 5: RDL 10# 4x10 (2 sets with dowel and 2 sets with 10#)    Time Entry(in minutes):  Manual Therapy Time Entry: 4  Therapeutic Activity Time Entry: 40  Therapeutic Exercise Time Entry: 24    Assessment & Plan   Assessment: Meet continues to progress. He does have difficulty maintaining neutral spine with resisted RDLs, but improves with tactile cueing from dowel. He is nearing discharge.       Patient will continue to benefit from skilled outpatient physical therapy to address the deficits listed in the " problem list box on initial evaluation, provide pt/family education and to maximize pt's level of independence in the home and community environment.     Patient's spiritual, cultural, and educational needs considered and patient agreeable to plan of care and goals.           Plan: Continue to strengthen left lower extremity. Practice utilizing foot intrinsics with single leg activities.    Goals:   Active       Long Term Goals       1.Report decreased L knee pain < / = 0/10  to increase tolerance for drag performances  (Met)       Start:  02/25/25    Expected End:  06/25/25    Resolved:  04/02/25         2.Patient goal: to return to drag performances (Met)       Start:  02/25/25    Expected End:  06/25/25    Resolved:  05/16/25         3.Increase strength to 90% LSI to increase tolerance for ADL and work activities. (Progressing)       Start:  02/25/25    Expected End:  06/25/25            4. Pt will report at CJ level (20-40% impaired) on FOTO knee to demonstrate increase in LE function with every day tasks.  (Met)       Start:  02/25/25    Expected End:  06/25/25    Resolved:  05/07/25           Resolved       Mid Term Goals       1.Report decreased L  knee pain  < / =  1/10  to increase tolerance for ADLs (Met)       Start:  02/25/25    Expected End:  04/08/25    Resolved:  03/16/25         2. Increase knee ROM to symmetrical with R knee in order to be able to perform ADLs without difficulty.  (Met)       Start:  02/25/25    Expected End:  04/08/25    Resolved:  04/20/25         3. Pt to tolerate HEP to improve ROM and independence with ADL's (Met)       Start:  02/25/25    Expected End:  04/08/25    Resolved:  03/18/25            Short Term Goals       1. Display a SLR without a quad lag in the L knee (Met)       Start:  02/25/25    Expected End:  03/11/25    Resolved:  03/05/25         2. Display full L knee hyperextension symmetrical with the R knee. (Met)       Start:  02/25/25    Expected End:  03/11/25     Resolved:  03/05/25             Joshua Marroquin, PT, DPT

## 2025-05-20 ENCOUNTER — CLINICAL SUPPORT (OUTPATIENT)
Dept: REHABILITATION | Facility: HOSPITAL | Age: 30
End: 2025-05-20
Payer: COMMERCIAL

## 2025-05-20 DIAGNOSIS — Z98.890 S/P LATERAL MENISCECTOMY OF LEFT KNEE: Primary | ICD-10-CM

## 2025-05-20 DIAGNOSIS — M25.662 DECREASED RANGE OF MOTION OF LEFT KNEE: ICD-10-CM

## 2025-05-20 DIAGNOSIS — M62.81 DECREASED MUSCLE STRENGTH: ICD-10-CM

## 2025-05-20 PROCEDURE — 97750 PHYSICAL PERFORMANCE TEST: CPT | Performed by: PHYSICAL THERAPIST

## 2025-05-20 PROCEDURE — 97110 THERAPEUTIC EXERCISES: CPT | Performed by: PHYSICAL THERAPIST

## 2025-05-20 PROCEDURE — 97530 THERAPEUTIC ACTIVITIES: CPT | Performed by: PHYSICAL THERAPIST

## 2025-05-20 NOTE — PROGRESS NOTES
"  Outpatient Rehab    Physical Therapy Progress Note    Patient Name: Meet Gilliam  MRN: [id number]  YOB: 1995  Encounter Date: 5/20/2025    Therapy Diagnosis:   Encounter Diagnoses   Name Primary?    S/P lateral meniscectomy of left knee Yes    Decreased muscle strength     Decreased range of motion of left knee      Physician: Isra Fox*    Physician Orders: Eval and Treat  Medical Diagnosis: Acute medial meniscus tear of left knee, initial encounter  Acute lateral meniscus tear of left knee, initial encounter    Visit # / Visits Authorized:  19 / 20  Insurance Authorization Period: 2/25/2025 to 12/31/2025  Date of Evaluation: 2/24/2025  Plan of Care Certification: 2/25/2025 to 6/25/2025      PT/PTA:     Number of PTA visits since last PT visit:   Time In: 1600   Time Out: 1700  Total Time (in minutes): 60   Total Billable Time (in minutes): 60    FOTO:  Intake Score:  %  Survey Score 2:  %  Survey Score 3:  %    Precautions:       Subjective   Completed a show without pain. Ready to transition to [facility] and self manage care moving forward..  Pain reported as 0/10.      Objective      Tindeq Isometric Testing         Date of Testing 5/20/2025           MRN [id number]           Involved leg  Left           Limb Length (cm/m)  0 Lateral femoral condyle to lateral malleolus        Patient Body Weight (lb/kg)  0          Tindeq Numbers Tindeq Calculations     Trial 1 Trial 2 Trial 3  Right Right LSI Left Left LSI    Knee Extension (kg) R 52.6 58.6 56 Knee Extension 55.73 98% 56.83 102%    L 54.3 58 58.2 Knee Flexion 38.37 114% 33.73 88%    Knee Flexion (kg) R 40.8 39 35.3 HS to Quad Ratio  68.84%  59.35%     L 34.5 33.6 33.1 Quad/BW (Nm/Kg) N/A  N/A             Treatment:  Therapeutic Exercise  TE 1: Elliptical 1.5 x 8 min  TE 4: DL heel raise from ramp 20x  TE 5: Self DF mobilization 20x  TE 6: SL heel raise 20x  Manual Therapy  MT 1: Knee assessment  Therapeutic Activity  TA 2: 12" step up 4x8 " "bilateral  TA 3: 6" step down 4 x 8 bilateral    Time Entry(in minutes):  Physical Performance Test (with Report) Time Entry: 15  Therapeutic Activity Time Entry: 15  Therapeutic Exercise Time Entry: 30    Assessment & Plan   Assessment: Has progressed very well. Ready for transtion to self managment with HEP.  Evaluation/Treatment Tolerance: Patient tolerated treatment well    The patient will continue to benefit from skilled outpatient physical therapy in order to address the deficits listed in the problem list on the initial evaluation, provide patient and family education, and maximize the patients level of independence in the home and community environments.     The patient's spiritual, cultural, and educational needs were considered, and the patient is agreeable to the plan of care and goals.           Plan: Trial self management with HEP.    Goals:   Resolved       Long Term Goals       1.Report decreased L knee pain < / = 0/10  to increase tolerance for drag performances  (Met)       Start:  02/25/25    Expected End:  06/25/25    Resolved:  04/02/25         2.Patient goal: to return to drag performances (Met)       Start:  02/25/25    Expected End:  06/25/25    Resolved:  05/16/25         3.Increase strength to 90% LSI to increase tolerance for ADL and work activities. (Met)       Start:  02/25/25    Expected End:  06/25/25    Resolved:  05/22/25         4. Pt will report at CJ level (20-40% impaired) on FOTO knee to demonstrate increase in LE function with every day tasks.  (Met)       Start:  02/25/25    Expected End:  06/25/25    Resolved:  05/07/25            Mid Term Goals       1.Report decreased L  knee pain  < / =  1/10  to increase tolerance for ADLs (Met)       Start:  02/25/25    Expected End:  04/08/25    Resolved:  03/16/25         2. Increase knee ROM to symmetrical with R knee in order to be able to perform ADLs without difficulty.  (Met)       Start:  02/25/25    Expected End:  04/08/25    " "Resolved:  04/20/25         3. Pt to tolerate HEP to improve ROM and independence with ADL's (Met)       Start:  02/25/25    Expected End:  04/08/25    Resolved:  03/18/25            Short Term Goals       1. Display a SLR without a quad lag in the L knee (Met)       Start:  02/25/25    Expected End:  03/11/25    Resolved:  03/05/25         2. Display full L knee hyperextension symmetrical with the R knee. (Met)       Start:  02/25/25    Expected End:  03/11/25    Resolved:  03/05/25             Ambrose Rivas, PT, DPT    Outpatient Rehab    Physical Therapy Progress Note    Patient Name: Meet Gilliam  MRN: [id number]  YOB: 1995  Encounter Date: 5/20/2025    Therapy Diagnosis:   Encounter Diagnoses   Name Primary?    S/P lateral meniscectomy of left knee Yes    Decreased muscle strength     Decreased range of motion of left knee      Physician: Isra Fox    Physician Orders: Eval and Treat  Medical Diagnosis: Acute medial meniscus tear of left knee, initial encounter  Acute lateral meniscus tear of left knee, initial encounter    Visit # / Visits Authorized:  19 / 20  Insurance Authorization Period: 2/25/2025 to 12/31/2025  Date of Evaluation:  2/25/2025   Plan of Care Certification:  2/28/2025 to 6/30/2025      PT/PTA:     Number of PTA visits since last PT visit:   Time In: 1600   Time Out: 1700  Total Time: 60   Total Billable Time: 60    FOTO:  Intake Score:  %  Survey Score 1:  %  Survey Score 2:  %         Subjective   Completed a show without pain. Ready to transition to HEP and self manage care moving forward..  Pain reported as 0/10.      Objective           Treatment:  PT extender available to assist with treatment as needed    Therapeutic Exercise  TE 1: Elliptical 1.5 x 8 min  TE 4: DL heel raise from ramp 20x  TE 5: Self DF mobilization 20x  TE 6: SL heel raise 20x  Manual Therapy  MT 1: Knee assessment  Therapeutic Activity  TA 2: 12" step up 4x8 bilateral  TA 3: 6" step " down 4 x 8 bilateral    Time Entry(in minutes):  Physical Performance Test (with Report) Time Entry: 15  Therapeutic Activity Time Entry: 15  Therapeutic Exercise Time Entry: 30    Assessment & Plan   Assessment: Has progressed very well. Ready for transtion to self managment with HEP.  Evaluation/Treatment Tolerance: Patient tolerated treatment well    Patient will continue to benefit from skilled outpatient physical therapy to address the deficits listed in the problem list box on initial evaluation, provide pt/family education and to maximize pt's level of independence in the home and community environment.     Patient's spiritual, cultural, and educational needs considered and patient agreeable to plan of care and goals.           Plan: Trial self management with HEP.    Goals:   Resolved       Long Term Goals       1.Report decreased L knee pain < / = 0/10  to increase tolerance for drag performances  (Met)       Start:  02/25/25    Expected End:  06/25/25    Resolved:  04/02/25         2.Patient goal: to return to drag performances (Met)       Start:  02/25/25    Expected End:  06/25/25    Resolved:  05/16/25         3.Increase strength to 90% LSI to increase tolerance for ADL and work activities. (Met)       Start:  02/25/25    Expected End:  06/25/25    Resolved:  05/22/25         4. Pt will report at CJ level (20-40% impaired) on FOTO knee to demonstrate increase in LE function with every day tasks.  (Met)       Start:  02/25/25    Expected End:  06/25/25    Resolved:  05/07/25            Mid Term Goals       1.Report decreased L  knee pain  < / =  1/10  to increase tolerance for ADLs (Met)       Start:  02/25/25    Expected End:  04/08/25    Resolved:  03/16/25         2. Increase knee ROM to symmetrical with R knee in order to be able to perform ADLs without difficulty.  (Met)       Start:  02/25/25    Expected End:  04/08/25    Resolved:  04/20/25         3. Pt to tolerate HEP to improve ROM and  independence with ADL's (Met)       Start:  02/25/25    Expected End:  04/08/25    Resolved:  03/18/25            Short Term Goals       1. Display a SLR without a quad lag in the L knee (Met)       Start:  02/25/25    Expected End:  03/11/25    Resolved:  03/05/25         2. Display full L knee hyperextension symmetrical with the R knee. (Met)       Start:  02/25/25    Expected End:  03/11/25    Resolved:  03/05/25             Ambrose Rivas, PT, DPT, DPT

## 2025-06-10 NOTE — PROGRESS NOTES
Subjective:   Patient ID:  Meet Gilliam is a 29 y.o. male who presents for evaluation of Establish Care    PROBLEM LIST:  HTN  HLD    HPI: Presents today for evaluation of HTN, HLD and tachycardia.Diagnosed with HTN about 5 years ago.  Home BP readings running 130-140's/80's. Eats out often. Hasn't exercised since breaking his leg. Often feels his heart beating hard or fast. Thinks he may have sleep apnea. No family history of premature CAD or CHF. Reviewed outside labs. , , HDL 36, .    ECG done today and personally reviewed by me shows NSR.    Past Medical History:   Diagnosis Date    Allergy     Hypertension     Mixed hyperlipidemia        Past Surgical History:   Procedure Laterality Date    KNEE ARTHROSCOPY W/ MENISCECTOMY Left 2/21/2025    Procedure: ARTHROSCOPY, KNEE, WITH MENISCECTOMY PARTIAL LATERAL;  Surgeon: KAREN Taylor MD;  Location: [facility];  Service: Orthopedics;  Laterality: Left;       Social History     Socioeconomic History    Marital status: Single   Tobacco Use    Smoking status: Never    Smokeless tobacco: Never   Substance and Sexual Activity    Alcohol use: Yes     Comment: social      Social Drivers of Health     Financial Resource Strain: Low Risk  (3/4/2025)    Overall Financial Resource Strain (CARDIA)     Difficulty of Paying Living Expenses: Not very hard   Food Insecurity: No Food Insecurity (3/4/2025)    Hunger Vital Sign     Worried About Running Out of Food in the Last Year: Never true     Ran Out of Food in the Last Year: Never true   Transportation Needs: No Transportation Needs (3/4/2025)    PRAPARE - Transportation     Lack of Transportation (Medical): No     Lack of Transportation (Non-Medical): No   Physical Activity: Insufficiently Active (3/4/2025)    Exercise Vital Sign     Days of Exercise per Week: 2 days     Minutes of Exercise per Session: 30 min   Stress: No Stress Concern Present (3/4/2025)    [place] [employer] -  Occupational Stress Questionnaire     Feeling of Stress : Only a little   Recent Concern: Stress - Stress Concern Present (1/26/2025)    [place] [employer] - Occupational Stress Questionnaire     Feeling of Stress : Rather much   Housing Stability: Low Risk  (3/4/2025)    Housing Stability Vital Sign     Unable to Pay for Housing in the Last Year: No     Number of Times Moved in the Last Year: 0     Homeless in the Last Year: No       Family History   Problem Relation Name Age of Onset    Hypertension Mother      Allergies Mother      Hypertension Father      Allergies Father      Allergies Sister      Heart disease Maternal Aunt      Heart attack Maternal Grandfather      Asthma Neg Hx         Patient's Medications   New Prescriptions    No medications on file   Previous Medications    BUPROPION (WELLBUTRIN XL) 150 MG TB24 TABLET    Take 150 mg by mouth every morning.    CLINDAMYCIN (CLEOCIN T) 1 % LOTION    Apply topically 2 (two) times daily.    FLUTICASONE PROPIONATE (FLONASE) 50 MCG/ACTUATION NASAL SPRAY    2 sprays each nostril once to twice daily    OMEPRAZOLE (PRILOSEC) 10 MG CAPSULE    Take 10 mg by mouth once daily.   Modified Medications    Modified Medication Previous Medication    AMLODIPINE (NORVASC) 10 MG TABLET amLODIPine (NORVASC) 10 MG tablet       Take 1 tablet (10 mg total) by mouth once daily.    Take 10 mg by mouth once daily.   Discontinued Medications    ALBUTEROL (PROVENTIL HFA) 90 MCG/ACTUATION INHALER    Inhale 2 puffs into the lungs every 6 (six) hours as needed for Wheezing or Shortness of Breath. Rescue    ASPIRIN (ECOTRIN) 81 MG EC TABLET    Take 1 tablet (81 mg total) by mouth 2 (two) times a day. for 14 days    BENZONATATE (TESSALON) 100 MG CAPSULE    Take 100 mg by mouth 3 (three) times daily.    CYANOCOBALAMIN (VITAMIN B-12) 100 MCG TABLET    Take 100 mcg by mouth once daily.    IPRATROPIUM (ATROVENT) 42 MCG (0.06 %) NASAL SPRAY    2 sprays by Nasal route 3  "(three) times daily as needed for Rhinitis (runny nose or post nasal drip).    LORATADINE (CLARITIN) 10 MG TABLET    Take 10 mg by mouth once daily.    ONDANSETRON (ZOFRAN-ODT) 4 MG TBDL    Dissolve 1 tablet (4 mg total) by mouth every 8 (eight) hours as needed (nausea).    OXYCODONE (ROXICODONE) 5 MG IMMEDIATE RELEASE TABLET    Take 1-2 tablets (5-10 mg total) by mouth every 4 to 6 hours as needed for Pain.       Review of Systems   Constitutional: Negative for malaise/fatigue and weight gain.   HENT:  Negative for hearing loss.    Eyes:  Negative for visual disturbance.   Cardiovascular:  Negative for chest pain, claudication, dyspnea on exertion, leg swelling, near-syncope, orthopnea, palpitations, paroxysmal nocturnal dyspnea and syncope.   Respiratory:  Negative for cough, shortness of breath, sleep disturbances due to breathing, snoring and wheezing.    Endocrine: Negative for cold intolerance, heat intolerance, polydipsia, polyphagia and polyuria.   Hematologic/Lymphatic: Negative for bleeding problem. Does not bruise/bleed easily.   Skin:  Negative for rash and suspicious lesions.   Musculoskeletal:  Negative for arthritis, falls, joint pain, muscle weakness and myalgias.   Gastrointestinal:  Negative for abdominal pain, change in bowel habit, constipation, diarrhea, heartburn, hematochezia, melena and nausea.   Genitourinary:  Negative for hematuria and nocturia.   Neurological:  Negative for excessive daytime sleepiness, dizziness, headaches, light-headedness, loss of balance and weakness.   Psychiatric/Behavioral:  Negative for depression. The patient is not nervous/anxious.    Allergic/Immunologic: Negative for environmental allergies.       BP (!) 148/92 (BP Location: Left arm, Patient Position: Sitting)   Pulse 97   Ht 5' 8" (1.727 m)   Wt 101.3 kg (223 lb 5.2 oz)   SpO2 96%   BMI 33.96 kg/m²     Objective:   Physical Exam  Constitutional:       Appearance: He is well-developed.      Comments:    " "  HENT:      Head: Normocephalic and atraumatic.   Eyes:      General: No scleral icterus.     Conjunctiva/sclera: Conjunctivae normal.      Pupils: Pupils are equal, round, and reactive to light.   Neck:      Thyroid: No thyromegaly.      Vascular: No hepatojugular reflux or JVD.      Trachea: No tracheal deviation.   Cardiovascular:      Rate and Rhythm: Normal rate and regular rhythm.      Chest Wall: PMI is not displaced.      Pulses: Intact distal pulses.           Carotid pulses are 2+ on the right side and 2+ on the left side.       Radial pulses are 2+ on the right side and 2+ on the left side.        Dorsalis pedis pulses are 2+ on the right side and 2+ on the left side.        Posterior tibial pulses are 2+ on the right side and 2+ on the left side.      Heart sounds: Normal heart sounds.   Pulmonary:      Effort: Pulmonary effort is normal.      Breath sounds: Normal breath sounds.   Abdominal:      General: Bowel sounds are normal. There is no distension.      Palpations: Abdomen is soft. There is no mass.      Tenderness: There is no abdominal tenderness.   Musculoskeletal:         General: No tenderness.      Cervical back: Normal range of motion and neck supple.   Lymphadenopathy:      Cervical: No cervical adenopathy.   Skin:     General: Skin is warm and dry.      Findings: No erythema or rash.      Nails: There is no clubbing.   Neurological:      Mental Status: He is alert and oriented to person, place, and time.   Psychiatric:         Speech: Speech normal.         Behavior: Behavior normal.         No results found for: "NA", "K", "CL", "CO2", "BUN", "CREATININE", "GLU", "HGBA1C", "MG", "AST", "ALT", "ALBUMIN", "PROT", "BILITOT", "WBC", "HGB", "HCT", "MCV", "PLT", "INR", "TSH", "CHOL", "HDL", "LDLCALC", "TRIG", "BNP"    Assessment:     1. Suspected sleep apnea : Referral to Sleep medicine placed for sleep study.   2. Primary hypertension : BP above goal. Increase amlodipine to 10 mg daily. Limit " sodium intake to < 1500mg daily and follow the DASH diet plan. Sleep referral as above. Discussed Welbutrin may be contributing. We discussed keeping a log of home blood pressures and notifying the office if it is consistently running above 130/80 mmHg.     3. Metabolic syndrome: : Following a heart health diet such as the Mediterranean Diet is recommended in addition to 150 minutes a week of moderate intensity exercise to lower cholesterol, maintain a healthy body weight, and improve overall cardiovascular health. LPa and HBGA1C ordered.   4. Mixed hyperlipidemia : Following a heart health diet such as the Mediterranean Diet is recommended in addition to 150 minutes a week of moderate intensity exercise to lower cholesterol, maintain a healthy body weight, and improve overall cardiovascular health.    5. Tachycardia : ECG normal. TSH ordered.       Plan:     Meet was seen today for establish care.    Diagnoses and all orders for this visit:    Suspected sleep apnea  -     Ambulatory referral/consult to Sleep Disorders; Future    Primary hypertension  -     Ambulatory referral/consult to Sleep Disorders; Future  -     Hemoglobin A1C; Future  -     TSH; Future  -     Lipoprotein A (LPA); Future    Encounter to establish care  -     SCHEDULED EKG 12-LEAD (to Muse)    Mixed hyperlipidemia    Tachycardia    Other orders  -     amLODIPine (NORVASC) 10 MG tablet; Take 1 tablet (10 mg total) by mouth once daily.        Thank you for allowing me to participate in this patient's care. Please do not hesitate to contact me with any questions or concerns.

## 2025-06-11 ENCOUNTER — OFFICE VISIT (OUTPATIENT)
Dept: CARDIOLOGY | Facility: CLINIC | Age: 30
End: 2025-06-11
Payer: COMMERCIAL

## 2025-06-11 ENCOUNTER — LAB VISIT (OUTPATIENT)
Dept: LAB | Facility: HOSPITAL | Age: 30
End: 2025-06-11
Attending: INTERNAL MEDICINE
Payer: COMMERCIAL

## 2025-06-11 VITALS
BODY MASS INDEX: 33.84 KG/M2 | SYSTOLIC BLOOD PRESSURE: 148 MMHG | OXYGEN SATURATION: 96 % | WEIGHT: 223.31 LBS | DIASTOLIC BLOOD PRESSURE: 92 MMHG | HEIGHT: 68 IN | HEART RATE: 97 BPM

## 2025-06-11 DIAGNOSIS — I10 PRIMARY HYPERTENSION: ICD-10-CM

## 2025-06-11 DIAGNOSIS — R29.818 SUSPECTED SLEEP APNEA: Primary | ICD-10-CM

## 2025-06-11 DIAGNOSIS — E78.2 MIXED HYPERLIPIDEMIA: ICD-10-CM

## 2025-06-11 DIAGNOSIS — R00.0 TACHYCARDIA: ICD-10-CM

## 2025-06-11 DIAGNOSIS — Z76.89 ENCOUNTER TO ESTABLISH CARE: ICD-10-CM

## 2025-06-11 LAB
EAG (OHS): 103 MG/DL (ref 68–131)
HBA1C MFR BLD: 5.2 % (ref 4–5.6)
OHS QRS DURATION: 74 MS
OHS QTC CALCULATION: 394 MS
TSH SERPL-ACNC: 1.22 UIU/ML (ref 0.4–4)

## 2025-06-11 PROCEDURE — 3080F DIAST BP >= 90 MM HG: CPT | Mod: CPTII,S$GLB,, | Performed by: INTERNAL MEDICINE

## 2025-06-11 PROCEDURE — 99999 PR PBB SHADOW E&M-EST. PATIENT-LVL V: CPT | Mod: PBBFAC,,, | Performed by: INTERNAL MEDICINE

## 2025-06-11 PROCEDURE — 83695 ASSAY OF LIPOPROTEIN(A): CPT

## 2025-06-11 PROCEDURE — 84443 ASSAY THYROID STIM HORMONE: CPT

## 2025-06-11 PROCEDURE — 36415 COLL VENOUS BLD VENIPUNCTURE: CPT

## 2025-06-11 PROCEDURE — 93000 ELECTROCARDIOGRAM COMPLETE: CPT | Mod: S$GLB,,, | Performed by: INTERNAL MEDICINE

## 2025-06-11 PROCEDURE — 1159F MED LIST DOCD IN RCRD: CPT | Mod: CPTII,S$GLB,, | Performed by: INTERNAL MEDICINE

## 2025-06-11 PROCEDURE — 3077F SYST BP >= 140 MM HG: CPT | Mod: CPTII,S$GLB,, | Performed by: INTERNAL MEDICINE

## 2025-06-11 PROCEDURE — 99204 OFFICE O/P NEW MOD 45 MIN: CPT | Mod: 25,S$GLB,, | Performed by: INTERNAL MEDICINE

## 2025-06-11 PROCEDURE — 83036 HEMOGLOBIN GLYCOSYLATED A1C: CPT

## 2025-06-11 PROCEDURE — 3008F BODY MASS INDEX DOCD: CPT | Mod: CPTII,S$GLB,, | Performed by: INTERNAL MEDICINE

## 2025-06-11 RX ORDER — AMLODIPINE BESYLATE 10 MG/1
10 TABLET ORAL DAILY
Qty: 90 TABLET | Refills: 3 | Status: SHIPPED | OUTPATIENT
Start: 2025-06-11

## 2025-06-11 NOTE — PATIENT INSTRUCTIONS
Following a heart health diet such as the Mediterranean Diet is recommended in addition to 150 minutes a week of moderate intensity exercise to lower cholesterol, maintain a healthy body weight, and improve overall cardiovascular health.    Increase amlodipine to 10 mg daily.    Limit sodium intake to < 1500mg daily and follow the DASH diet plan.    We discussed keeping a log of home blood pressures and notifying the office if it is consistently running above 130/80 mmHg.

## 2025-06-12 ENCOUNTER — RESULTS FOLLOW-UP (OUTPATIENT)
Dept: CARDIOLOGY | Facility: CLINIC | Age: 30
End: 2025-06-12

## 2025-06-16 LAB — W LP(A): 120 NMOL/L

## 2025-08-06 ENCOUNTER — OFFICE VISIT (OUTPATIENT)
Dept: SLEEP MEDICINE | Facility: CLINIC | Age: 30
End: 2025-08-06
Payer: COMMERCIAL

## 2025-08-06 VITALS
HEART RATE: 112 BPM | HEIGHT: 68 IN | DIASTOLIC BLOOD PRESSURE: 81 MMHG | WEIGHT: 216.69 LBS | BODY MASS INDEX: 32.84 KG/M2 | SYSTOLIC BLOOD PRESSURE: 119 MMHG

## 2025-08-06 DIAGNOSIS — I10 PRIMARY HYPERTENSION: ICD-10-CM

## 2025-08-06 DIAGNOSIS — R29.818 SUSPECTED SLEEP APNEA: ICD-10-CM

## 2025-08-06 DIAGNOSIS — R35.1 NOCTURIA: ICD-10-CM

## 2025-08-06 DIAGNOSIS — F51.09 OTHER INSOMNIA NOT DUE TO A SUBSTANCE OR KNOWN PHYSIOLOGICAL CONDITION: ICD-10-CM

## 2025-08-06 DIAGNOSIS — G47.10 HYPERSOMNOLENCE: Primary | ICD-10-CM

## 2025-08-06 DIAGNOSIS — R06.81 WITNESSED EPISODE OF APNEA: ICD-10-CM

## 2025-08-06 DIAGNOSIS — R06.83 SNORING: ICD-10-CM

## 2025-08-06 PROCEDURE — 99999 PR PBB SHADOW E&M-EST. PATIENT-LVL III: CPT | Mod: PBBFAC,,, | Performed by: PHYSICIAN ASSISTANT

## 2025-08-06 PROCEDURE — 99204 OFFICE O/P NEW MOD 45 MIN: CPT | Mod: S$GLB,,, | Performed by: PHYSICIAN ASSISTANT

## 2025-08-06 PROCEDURE — 3044F HG A1C LEVEL LT 7.0%: CPT | Mod: CPTII,S$GLB,, | Performed by: PHYSICIAN ASSISTANT

## 2025-08-06 PROCEDURE — 1159F MED LIST DOCD IN RCRD: CPT | Mod: CPTII,S$GLB,, | Performed by: PHYSICIAN ASSISTANT

## 2025-08-06 PROCEDURE — 3008F BODY MASS INDEX DOCD: CPT | Mod: CPTII,S$GLB,, | Performed by: PHYSICIAN ASSISTANT

## 2025-08-06 PROCEDURE — 3074F SYST BP LT 130 MM HG: CPT | Mod: CPTII,S$GLB,, | Performed by: PHYSICIAN ASSISTANT

## 2025-08-06 PROCEDURE — 1160F RVW MEDS BY RX/DR IN RCRD: CPT | Mod: CPTII,S$GLB,, | Performed by: PHYSICIAN ASSISTANT

## 2025-08-06 PROCEDURE — 3079F DIAST BP 80-89 MM HG: CPT | Mod: CPTII,S$GLB,, | Performed by: PHYSICIAN ASSISTANT

## 2025-08-06 NOTE — PROGRESS NOTES
"Referred by Jo Ann Tello MD     NEW PATIENT VISIT    Meet Gilliam  is a pleasant 29 y.o. male  with PMH significant for HTN, HLD, BMI 32+  who presents for sleep evaluation following referral from Cardiology       C/o snoring, rare snoring arousals (typically when only sleeping on back), witnessed apneas, poor disrupted sleep, and occasional daytime sleepiness and fatigue. Feels a lot of fatigue is 2/2 to stress related to job. States when job gets overwhelming will often go take a nap. Denies known sleep walking/talking as an adult, but does endorse significant sleep walking in childhood. Does endorse occasional hypnagogic jerking, but denies true dream enactment behaviors. Does endorse occasional urge to move legs after a couple of bad nights of poor sleep, but denies consistent sx. Hx of HTN. States his Cardiologist recommended evaluation for ROMINA, which is why he presents today.     SLEEP SCHEDULE   Environment    Bed Time 10:30PM-MN weeknight  11PM-1AM weekend   Sleep Latency 5-10mins   Arousals 3-6   Nocturia 1   Back to sleep 1-15mins   Wake time 7:30-8:30AM weekday  8:30-9AM weekend   Naps 1-2 per week   Perceived TST 8-10hrs           8/6/2025    11:15 AM   Sleep Clinic ROS    Difficulty breathing through the nose?  No   Sore throat or dry mouth in the morning? No   Irregular or very fast heart beat?  Yes   Shortness of breath?  Yes   Acid reflux? Yes   Body aches and pains?  Yes   Morning headaches? No   Dizziness? No   Mood changes?  No   Do you exercise?  Sometimes   Do you feel like moving your legs a lot?  Sometimes       Past Medical History:   Diagnosis Date    Allergy     Hypertension     Mixed hyperlipidemia      Problem List[1]  Current Medications[2]       Vitals:    08/06/25 1318   BP: 119/81   BP Location: Right arm   Patient Position: Sitting   Pulse: (!) 112   Weight: 98.3 kg (216 lb 11.4 oz)   Height: 5' 8" (1.727 m)     Physical Exam:    GEN:   Well-appearing  Psych:  Appropriate " "affect, demonstrates insight  SKIN:  No rash on the face or bridge of the nose      LABS:   No results found for: "HGB", "CO2"      RECORDS REVIEWED:    No previous sleep study    ASSESSMENT        8/6/2025    11:11 AM   EPWORTH SLEEPINESS SCALE   Sitting and reading 2   Watching TV 1   Sitting, inactive in a public place (e.g. a theatre or a meeting) 1   As a passenger in a car for an hour without a break 1   Lying down to rest in the afternoon when circumstances permit 3   Sitting and talking to someone 0   Sitting quietly after a lunch without alcohol 2   In a car, while stopped for a few minutes in traffic 0   Total score 10        Patient-reported       PROBLEM DESCRIPTION/ Sx on Presentation  STATUS   Sx ROMINA   + snoring, + rare snoring arousals (on back), + witnessed apneas  Wakes feeling refreshed  Denies AM headaches  + father has ROMINA on CPAP machine   New   Daytime Sx   + sleepiness when inactive   Denies drowsiness when driving  ESS 10/24 on intake  New   Insomnia   Trouble falling asleep: 5-10mins  Arousals:         3-6  Hard to get back to sleep?: 1-15mins    Prior pertinent medications:  Current pertinent medications:   New   Nocturia   x 1 per sleep period  New   Other issues:       PLAN      -recommend sleep testing  -HST ordered  -discussed trial therapy if ROMINA present and the patient is open to a trial of CPAP therapy  -discussed ROMINA and PAP with patient in detail, including possible complications of untreated ROMINA like heart attack/stroke  -advised on strict driving precautions; advised never to drive drowsy     Advised on plan of care. Answered all patient questions. Patient verbalized understanding and voiced agreement with plan of care.     RTC if dx of ROMINA made and CPAP ordered, will need follow up 31-90 days after receiving machine for compliance       The patient was given open opportunity to ask questions and/or express concerns about treatment plan. All questions/concerns were discussed.   "   Two patient identifiers used prior to evaluation.                  [1]   Patient Active Problem List  Diagnosis    COVID-19 virus infection    S/P lateral meniscectomy of left knee    Decreased muscle strength    Decreased range of motion (ROM) of knee    Primary hypertension    Mixed hyperlipidemia    Encounter to establish care   [2]   Current Outpatient Medications:     amLODIPine (NORVASC) 10 MG tablet, Take 1 tablet (10 mg total) by mouth once daily., Disp: 90 tablet, Rfl: 3    buPROPion (WELLBUTRIN XL) 150 MG TB24 tablet, Take 150 mg by mouth every morning., Disp: , Rfl:     clindamycin (CLEOCIN T) 1 % lotion, Apply topically 2 (two) times daily., Disp: , Rfl:     fluticasone propionate (FLONASE) 50 mcg/actuation nasal spray, 2 sprays each nostril once to twice daily, Disp: 16 g, Rfl: 11    omeprazole (PRILOSEC) 10 MG capsule, Take 10 mg by mouth once daily., Disp: , Rfl:

## 2025-08-13 ENCOUNTER — TELEPHONE (OUTPATIENT)
Dept: SLEEP MEDICINE | Facility: OTHER | Age: 30
End: 2025-08-13
Payer: COMMERCIAL

## 2025-08-14 ENCOUNTER — HOSPITAL ENCOUNTER (OUTPATIENT)
Dept: SLEEP MEDICINE | Facility: OTHER | Age: 30
Discharge: HOME | End: 2025-08-14
Attending: PHYSICIAN ASSISTANT
Payer: COMMERCIAL

## 2025-08-14 DIAGNOSIS — R35.1 NOCTURIA: ICD-10-CM

## 2025-08-14 DIAGNOSIS — R29.818 SUSPECTED SLEEP APNEA: ICD-10-CM

## 2025-08-14 DIAGNOSIS — G47.10 HYPERSOMNOLENCE: ICD-10-CM

## 2025-08-14 DIAGNOSIS — I10 PRIMARY HYPERTENSION: ICD-10-CM

## 2025-08-14 DIAGNOSIS — R06.81 WITNESSED EPISODE OF APNEA: ICD-10-CM

## 2025-08-14 DIAGNOSIS — R06.83 SNORING: ICD-10-CM

## 2025-08-14 DIAGNOSIS — F51.09 OTHER INSOMNIA NOT DUE TO A SUBSTANCE OR KNOWN PHYSIOLOGICAL CONDITION: ICD-10-CM

## 2025-08-14 PROCEDURE — 95800 SLP STDY UNATTENDED: CPT

## 2025-08-15 PROBLEM — G47.10 HYPERSOMNOLENCE: Status: ACTIVE | Noted: 2025-08-15

## 2025-08-19 PROCEDURE — 95800 SLP STDY UNATTENDED: CPT | Mod: 26,52,, | Performed by: INTERNAL MEDICINE

## 2025-08-21 ENCOUNTER — PATIENT MESSAGE (OUTPATIENT)
Dept: SLEEP MEDICINE | Facility: CLINIC | Age: 30
End: 2025-08-21
Payer: COMMERCIAL

## (undated) DEVICE — GLOVE BIOGEL PI MICRO INDIC 7

## (undated) DEVICE — PENCIL ROCKER SWITCH 10FT CORD

## (undated) DEVICE — GLOVE BIOGEL SKINSENSE PI 7.5

## (undated) DEVICE — NDL HYPO STD REG BVL 18GX1.5IN

## (undated) DEVICE — PAD ABDOMINAL STERILE 8X10IN

## (undated) DEVICE — YANKAUER OPEN TIP W/O VENT

## (undated) DEVICE — ELECTRODE REM PLYHSV RETURN 9

## (undated) DEVICE — GLOVE BIOGEL SKINSENSE PI 8.0

## (undated) DEVICE — GOWN ECLIPSE REINF LV4 XLNG XL

## (undated) DEVICE — COVER PROXIMA MAYO STAND

## (undated) DEVICE — Device

## (undated) DEVICE — PROBE ARTHSCP EDGE ENERGY 50

## (undated) DEVICE — DRAPE TOP 53X102IN

## (undated) DEVICE — BLADE SHAVER LANZA 4.2X13CM

## (undated) DEVICE — BNDG COFLEX FOAM LF2 ST 6X5YD

## (undated) DEVICE — SOL NACL 0.9% IV INJ 1000ML

## (undated) DEVICE — DRESSING XEROFORM NONADH 1X8IN

## (undated) DEVICE — SYR 30CC LUER LOCK

## (undated) DEVICE — COVER CAMERA OPERATING ROOM

## (undated) DEVICE — DRAPE STERI U-SHAPED 47X51IN

## (undated) DEVICE — TOURNIQUET SB QC DP 34X4IN

## (undated) DEVICE — SUT ETHICON 3-0 BLK MONO PS

## (undated) DEVICE — UNDERGLOVES BIOGEL PI SIZE 8

## (undated) DEVICE — BANDAGE MATRIX HK LOOP 6IN 5YD

## (undated) DEVICE — UNDERGLOVES BIOGEL PI SIZE 8.5

## (undated) DEVICE — SPONGE COTTON TRAY 4X4IN

## (undated) DEVICE — GLOVE BIOGEL PI MICRO SZ 7

## (undated) DEVICE — DRAPE ARTHSCP T ORTHOMAX POUCH

## (undated) DEVICE — DRAPE STERI INSTRUMENT 1018